# Patient Record
Sex: FEMALE | Race: WHITE | NOT HISPANIC OR LATINO | Employment: OTHER | URBAN - METROPOLITAN AREA
[De-identification: names, ages, dates, MRNs, and addresses within clinical notes are randomized per-mention and may not be internally consistent; named-entity substitution may affect disease eponyms.]

---

## 2018-04-20 ENCOUNTER — LAB REQUISITION (OUTPATIENT)
Dept: LAB | Facility: HOSPITAL | Age: 78
End: 2018-04-20

## 2018-04-20 ENCOUNTER — OFFICE VISIT (OUTPATIENT)
Dept: FAMILY MEDICINE CLINIC | Facility: CLINIC | Age: 78
End: 2018-04-20

## 2018-04-20 VITALS
HEIGHT: 63 IN | DIASTOLIC BLOOD PRESSURE: 68 MMHG | WEIGHT: 80 LBS | SYSTOLIC BLOOD PRESSURE: 110 MMHG | BODY MASS INDEX: 14.18 KG/M2 | OXYGEN SATURATION: 97 % | HEART RATE: 64 BPM

## 2018-04-20 DIAGNOSIS — Z12.2 ENCOUNTER FOR SCREENING FOR LUNG CANCER: ICD-10-CM

## 2018-04-20 DIAGNOSIS — Z87.891 HISTORY OF NICOTINE DEPENDENCE: Primary | ICD-10-CM

## 2018-04-20 DIAGNOSIS — R53.83 FATIGUE, UNSPECIFIED TYPE: ICD-10-CM

## 2018-04-20 DIAGNOSIS — J44.9 CHRONIC OBSTRUCTIVE PULMONARY DISEASE, UNSPECIFIED COPD TYPE (HCC): ICD-10-CM

## 2018-04-20 DIAGNOSIS — Z00.00 ROUTINE GENERAL MEDICAL EXAMINATION AT A HEALTH CARE FACILITY: ICD-10-CM

## 2018-04-20 DIAGNOSIS — Z71.6 TOBACCO ABUSE COUNSELING: ICD-10-CM

## 2018-04-20 DIAGNOSIS — R63.4 WEIGHT LOSS: ICD-10-CM

## 2018-04-20 DIAGNOSIS — E55.9 VITAMIN D DEFICIENCY: ICD-10-CM

## 2018-04-20 PROBLEM — K21.9 GERD (GASTROESOPHAGEAL REFLUX DISEASE): Status: ACTIVE | Noted: 2018-04-20

## 2018-04-20 PROBLEM — I82.4Y9 DEEP VEIN THROMBOSIS (DVT) OF PROXIMAL LOWER EXTREMITY (HCC): Status: ACTIVE | Noted: 2018-04-20

## 2018-04-20 PROBLEM — J43.9 EMPHYSEMA LUNG (HCC): Status: ACTIVE | Noted: 2018-04-20

## 2018-04-20 PROBLEM — H91.90 HEARING LOSS: Status: ACTIVE | Noted: 2018-04-20

## 2018-04-20 LAB
25(OH)D3+25(OH)D2 SERPL-MCNC: 17 NG/ML
ALBUMIN SERPL-MCNC: 3.1 G/DL (ref 3.2–4.8)
ALBUMIN/GLOB SERPL: 1 G/DL (ref 1.5–2.5)
ALP SERPL-CCNC: 115 U/L (ref 25–100)
ALT SERPL-CCNC: 9 U/L (ref 7–40)
AST SERPL-CCNC: 13 U/L (ref 0–33)
BASOPHILS # BLD AUTO: 0.04 10*3/MM3 (ref 0–0.2)
BASOPHILS NFR BLD AUTO: 0.5 % (ref 0–1)
BILIRUB SERPL-MCNC: 0.4 MG/DL (ref 0.3–1.2)
BUN SERPL-MCNC: 15 MG/DL (ref 9–23)
BUN/CREAT SERPL: 18.8 (ref 7–25)
CALCIUM SERPL-MCNC: 8.4 MG/DL (ref 8.7–10.4)
CHLORIDE SERPL-SCNC: 108 MMOL/L (ref 99–109)
CO2 SERPL-SCNC: 28 MMOL/L (ref 20–31)
CREAT SERPL-MCNC: 0.8 MG/DL (ref 0.6–1.3)
EOSINOPHIL # BLD AUTO: 0.07 10*3/MM3 (ref 0–0.3)
EOSINOPHIL NFR BLD AUTO: 0.8 % (ref 0–3)
ERYTHROCYTE [DISTWIDTH] IN BLOOD BY AUTOMATED COUNT: 15.2 % (ref 11.3–14.5)
FOLATE SERPL-MCNC: 7.46 NG/ML (ref 3.2–20)
GFR SERPLBLD CREATININE-BSD FMLA CKD-EPI: 70 ML/MIN/1.73
GFR SERPLBLD CREATININE-BSD FMLA CKD-EPI: 84 ML/MIN/1.73
GLOBULIN SER CALC-MCNC: 3.1 GM/DL
GLUCOSE SERPL-MCNC: 94 MG/DL (ref 70–100)
HCT VFR BLD AUTO: 40.6 % (ref 34.5–44)
HGB BLD-MCNC: 12.7 G/DL (ref 11.5–15.5)
IMM GRANULOCYTES # BLD: 0.02 10*3/MM3 (ref 0–0.03)
IMM GRANULOCYTES NFR BLD: 0.2 % (ref 0–0.6)
LYMPHOCYTES # BLD AUTO: 2 10*3/MM3 (ref 0.6–4.8)
LYMPHOCYTES NFR BLD AUTO: 23 % (ref 24–44)
MCH RBC QN AUTO: 28.2 PG (ref 27–31)
MCHC RBC AUTO-ENTMCNC: 31.3 G/DL (ref 32–36)
MCV RBC AUTO: 90.2 FL (ref 80–99)
MONOCYTES # BLD AUTO: 0.53 10*3/MM3 (ref 0–1)
MONOCYTES NFR BLD AUTO: 6.1 % (ref 0–12)
NEUTROPHILS # BLD AUTO: 6.04 10*3/MM3 (ref 1.5–8.3)
NEUTROPHILS NFR BLD AUTO: 69.6 % (ref 41–71)
PLATELET # BLD AUTO: 335 10*3/MM3 (ref 150–450)
POTASSIUM SERPL-SCNC: 4.2 MMOL/L (ref 3.5–5.5)
PROT SERPL-MCNC: 6.2 G/DL (ref 5.7–8.2)
RBC # BLD AUTO: 4.5 10*6/MM3 (ref 3.89–5.14)
SODIUM SERPL-SCNC: 141 MMOL/L (ref 132–146)
TSH SERPL DL<=0.005 MIU/L-ACNC: 3.15 MIU/ML (ref 0.35–5.35)
VIT B12 SERPL-MCNC: 539 PG/ML (ref 211–911)
WBC # BLD AUTO: 8.68 10*3/MM3 (ref 3.5–10.8)

## 2018-04-20 PROCEDURE — 36415 COLL VENOUS BLD VENIPUNCTURE: CPT | Performed by: INTERNAL MEDICINE

## 2018-04-20 PROCEDURE — 99203 OFFICE O/P NEW LOW 30 MIN: CPT | Performed by: INTERNAL MEDICINE

## 2018-04-20 RX ORDER — ESOMEPRAZOLE MAGNESIUM 40 MG/1
40 CAPSULE, DELAYED RELEASE ORAL DAILY PRN
COMMUNITY
End: 2019-06-28

## 2018-04-20 RX ORDER — ACETAMINOPHEN 500 MG
500 TABLET ORAL 2 TIMES DAILY PRN
COMMUNITY

## 2018-04-20 RX ORDER — FAMOTIDINE 20 MG/1
20 TABLET, FILM COATED ORAL DAILY PRN
COMMUNITY
End: 2020-11-12

## 2018-04-20 NOTE — PROGRESS NOTES
"77F here to est care.  Prior Dr. Jo patient      -c/o tired all the time, does not eat good, not hungry, cold all the time  -on prednisone in past for emphesema in past, \"really helped her\"  -ros: +chills, wt loss, lack of appetite, n/v, fatigue - stating does not eat right.  No diarrhea, no abdominal pain.    -h/o copd - recent cold - chronic cough, no production, nosob, no wheezing  Would like to have an inhaler.    Review of Systems   General: + fatigue, no fever/chills, unintentional wt loss, malaise, night sweats  Skin: no rash, no hives, no lesions,   Eyes: no visual disturbance   Heme: no brusing, no bleeding  ENT: no hearing loss, no dizziness, no nosebleed, no hoarseness  Endocrine: , no polyuria, polyphagia, polydipsia, no heat or cold intolerance  GI: as in hpi  : no dysuria, no urinary frequency,, no hematuria, or incontinence  Extremities: no edema, , no claudication  Cardiac: no chest pain, no palpitations, no orthopnea, no PND  Respiratory:  no sputum, no wheezing, no sob , no hemoptysis  Neuro: no headache, no seizure,, no paresthesias or weakness  Psych: no anxiety, no depression    Patient Active Problem List    Diagnosis   • Deep vein thrombosis (DVT) of proximal lower extremity [I82.4Y9]   • Hearing loss [H91.90]   • GERD (gastroesophageal reflux disease) [K21.9]     Overview Note:     nexium or pepcid prn     • Emphysema lung [J43.9]     Overview Note:     Current smoker, occassional       Past Surgical History:   Procedure Laterality Date   • CHOLECYSTECTOMY     • FEMUR FRACTURE SURGERY Left    • TUBAL ABDOMINAL LIGATION       Current Outpatient Prescriptions   Medication Sig Dispense Refill   • acetaminophen (TYLENOL) 500 MG tablet Take 500 mg by mouth 2 (Two) Times a Day As Needed for Mild Pain .     • esomeprazole (nexIUM) 40 MG capsule Take 40 mg by mouth Daily As Needed.     • famotidine (PEPCID) 20 MG tablet Take 20 mg by mouth Daily As Needed for Heartburn.       No current " "facility-administered medications for this visit.      No Known Allergies    Social History     Social History   • Marital status:    • Number of children: 5     Social History Main Topics   • Smoking status: Current Every Day Smoker     Packs/day: 1.00     Years: 50.00   • Smokeless tobacco: Never Used      Comment: down to 0.5ppd   • Alcohol use No   • Drug use: No     Other Topics Concern   • Not on file     Social History Narrative    4/18:    Daughter, Laura Saucedo , moved in with her - worries about her.    5 kids.    8 gk.    Exercise: no     Family History   Problem Relation Age of Onset   • Heart attack Mother    • Heart disease Mother    • Cervical cancer Daughter    • Lupus Daughter                    OBJECTIVE:    /68   Pulse 64   Ht 160 cm (63\")   Wt 36.3 kg (80 lb)   SpO2 97%   BMI 14.17 kg/m²   General: no distress, alert and oriented, frail and cachectic  Chest: Lung sounds are clear to auscultation, no wheezes, rales or rhonchi, with symmetric air entry. No respiratory distress  Cardiovascular: RRR with no murmur auscultated    GI: soft nt/nd, normoactive bs, no mass  Back: on cva tenderness  Ext: No edema, pedal pulses intact    Review of old chart shows fairly stable weight of 80-85lb past 3-4yrs.      A/P    1. History of nicotine dependence    2. Chronic obstructive pulmonary disease, unspecified COPD type    3. Weight loss    4. Fatigue, unspecified type    5. Encounter for screening for lung cancer    6. Tobacco abuse counseling      As above CT chest lung ca screening  Labs as ordered  Taught use of stiolto inhaler - samples given  F/u in 4 weeks    Smoking Cessation discussion:     We discussed that there are a number of resources and interventions to assist with smoking cessation if needed in the future including the 1-800-Quit Now line.(Included in the decision aid shared with the patient today).   On a scale of zero to ten, the patient rates their motivation to quit at a " 5 out of 10 today.  Referral to stop smoking class has been offered. Medication options for tobacco cessation have been discussed with the patient.     Recommendations for continued lung cancer screening:      We discussed the NCCN guidelines for lung cancer screening and the patient verbalized understanding that annual screening is recommended until fifteen years beyond smoking as long as they have no other disease or comorbidity that would prevent them from receiving cancer treatments such as surgery should a lung cancer be detected.  After review of the NCCN guidelines and recommendations for ongoing screening, the patient verbalized understanding of recommendations for follow-up.  The patient has decided to proceed with a Low Dose Lung Cancer Screening CT today.

## 2018-04-20 NOTE — PATIENT INSTRUCTIONS
Please review the decision aid used during our discussion regarding the Low dose lung cancer screening visit today.                            For more information:    Quit Now [place]  1-800-QUIT-NOW  https://[place].quitlogix.org/en-[place]/  Steps to Quit Smoking  Smoking tobacco can be harmful to your health and can affect almost every organ in your body. Smoking puts you, and those around you, at risk for developing many serious chronic diseases. Quitting smoking is difficult, but it is one of the best things that you can do for your health. It is never too late to quit.  What are the benefits of quitting smoking?  When you quit smoking, you lower your risk of developing serious diseases and conditions, such as:  · Lung cancer or lung disease, such as COPD.  · Heart disease.  · Stroke.  · Heart attack.  · Infertility.  · Osteoporosis and bone fractures.  Additionally, symptoms such as coughing, wheezing, and shortness of breath may get better when you quit. You may also find that you get sick less often because your body is stronger at fighting off colds and infections. If you are pregnant, quitting smoking can help to reduce your chances of having a baby of low birth weight.  How do I get ready to quit?  When you decide to quit smoking, create a plan to make sure that you are successful. Before you quit:  · Pick a date to quit. Set a date within the next two weeks to give you time to prepare.  · Write down the reasons why you are quitting. Keep this list in places where you will see it often, such as on your bathroom mirror or in your car or wallet.  · Identify the people, places, things, and activities that make you want to smoke (triggers) and avoid them. Make sure to take these actions:  ¨ Throw away all cigarettes at home, at work, and in your car.  ¨ Throw away smoking accessories, such as ashtrays and lighters.  ¨ Clean your car and make sure to empty the ashtray.  ¨ Clean your home, including curtains  and carpets.  · Tell your family, friends, and coworkers that you are quitting. Support from your loved ones can make quitting easier.  · Talk with your health care provider about your options for quitting smoking.  · Find out what treatment options are covered by your health insurance.  What strategies can I use to quit smoking?  Talk with your healthcare provider about different strategies to quit smoking. Some strategies include:  · Quitting smoking altogether instead of gradually lessening how much you smoke over a period of time. Research shows that quitting “cold turkey” is more successful than gradually quitting.  · Attending in-person counseling to help you build problem-solving skills. You are more likely to have success in quitting if you attend several counseling sessions. Even short sessions of 10 minutes can be effective.  · Finding resources and support systems that can help you to quit smoking and remain smoke-free after you quit. These resources are most helpful when you use them often. They can include:  ¨ Online chats with a counselor.  ¨ Telephone quitlines.  ¨ Printed self-help materials.  ¨ Support groups or group counseling.  ¨ Text messaging programs.  ¨ Mobile phone applications.  · Taking medicines to help you quit smoking. (If you are pregnant or breastfeeding, talk with your health care provider first.) Some medicines contain nicotine and some do not. Both types of medicines help with cravings, but the medicines that include nicotine help to relieve withdrawal symptoms. Your health care provider may recommend:  ¨ Nicotine patches, gum, or lozenges.  ¨ Nicotine inhalers or sprays.  ¨ Non-nicotine medicine that is taken by mouth.  Talk with your health care provider about combining strategies, such as taking medicines while you are also receiving in-person counseling. Using these two strategies together makes you more likely to succeed in quitting than if you used either strategy on its  own.  If you are pregnant or breastfeeding, talk with your health care provider about finding counseling or other support strategies to quit smoking. Do not take medicine to help you quit smoking unless told to do so by your health care provider.  What things can I do to make it easier to quit?  Quitting smoking might feel overwhelming at first, but there is a lot that you can do to make it easier. Take these important actions:  · Reach out to your family and friends and ask that they support and encourage you during this time. Call telephone quitlines, reach out to support groups, or work with a counselor for support.  · Ask people who smoke to avoid smoking around you.  · Avoid places that trigger you to smoke, such as bars, parties, or smoke-break areas at work.  · Spend time around people who do not smoke.  · Lessen stress in your life, because stress can be a smoking trigger for some people. To lessen stress, try:  ¨ Exercising regularly.  ¨ Deep-breathing exercises.  ¨ Yoga.  ¨ Meditating.  ¨ Performing a body scan. This involves closing your eyes, scanning your body from head to toe, and noticing which parts of your body are particularly tense. Purposefully relax the muscles in those areas.  · Download or purchase mobile phone or tablet apps (applications) that can help you stick to your quit plan by providing reminders, tips, and encouragement. There are many free apps, such as QuitGuide from the CDC (Centers for Disease Control and Prevention). You can find other support for quitting smoking (smoking cessation) through smokefree.gov and other websites.  How will I feel when I quit smoking?  Within the first 24 hours of quitting smoking, you may start to feel some withdrawal symptoms. These symptoms are usually most noticeable 2-3 days after quitting, but they usually do not last beyond 2-3 weeks. Changes or symptoms that you might experience include:  · Mood swings.  · Restlessness, anxiety, or  irritation.  · Difficulty concentrating.  · Dizziness.  · Strong cravings for sugary foods in addition to nicotine.  · Mild weight gain.  · Constipation.  · Nausea.  · Coughing or a sore throat.  · Changes in how your medicines work in your body.  · A depressed mood.  · Difficulty sleeping (insomnia).  After the first 2-3 weeks of quitting, you may start to notice more positive results, such as:  · Improved sense of smell and taste.  · Decreased coughing and sore throat.  · Slower heart rate.  · Lower blood pressure.  · Clearer skin.  · The ability to breathe more easily.  · Fewer sick days.  Quitting smoking is very challenging for most people. Do not get discouraged if you are not successful the first time. Some people need to make many attempts to quit before they achieve long-term success. Do your best to stick to your quit plan, and talk with your health care provider if you have any questions or concerns.  This information is not intended to replace advice given to you by your health care provider. Make sure you discuss any questions you have with your health care provider.  Document Released: 12/12/2002 Document Revised: 08/15/2017 Document Reviewed: 05/03/2016  [employer] Patient Education © 2017 [employer]

## 2018-04-27 ENCOUNTER — HOSPITAL ENCOUNTER (OUTPATIENT)
Dept: CT IMAGING | Facility: HOSPITAL | Age: 78
Discharge: HOME | End: 2018-04-27
Admitting: INTERNAL MEDICINE

## 2018-04-27 DIAGNOSIS — Z87.891 HISTORY OF NICOTINE DEPENDENCE: ICD-10-CM

## 2018-04-27 PROCEDURE — G0297 LDCT FOR LUNG CA SCREEN: HCPCS

## 2018-04-27 RX ORDER — ERGOCALCIFEROL 1.25 MG/1
50000 CAPSULE ORAL WEEKLY
Qty: 12 CAPSULE | Refills: 0 | Status: SHIPPED | OUTPATIENT
Start: 2018-04-27 | End: 2018-05-25 | Stop reason: SDUPTHER

## 2018-04-30 PROBLEM — J47.9 BRONCHIECTASIS (HCC): Status: ACTIVE | Noted: 2018-04-30

## 2018-04-30 PROBLEM — R93.89 ABNORMAL CT OF THE CHEST: Status: ACTIVE | Noted: 2018-04-30

## 2018-05-25 ENCOUNTER — OFFICE VISIT (OUTPATIENT)
Dept: FAMILY MEDICINE CLINIC | Facility: CLINIC | Age: 78
End: 2018-05-25

## 2018-05-25 VITALS
BODY MASS INDEX: 13.79 KG/M2 | WEIGHT: 77.8 LBS | DIASTOLIC BLOOD PRESSURE: 68 MMHG | SYSTOLIC BLOOD PRESSURE: 110 MMHG | HEIGHT: 63 IN | OXYGEN SATURATION: 97 % | HEART RATE: 72 BPM

## 2018-05-25 DIAGNOSIS — E55.9 VITAMIN D DEFICIENCY: ICD-10-CM

## 2018-05-25 DIAGNOSIS — J47.9 BRONCHIECTASIS WITHOUT COMPLICATION (HCC): ICD-10-CM

## 2018-05-25 DIAGNOSIS — R63.6 LOW WEIGHT: ICD-10-CM

## 2018-05-25 DIAGNOSIS — J43.9 PULMONARY EMPHYSEMA, UNSPECIFIED EMPHYSEMA TYPE (HCC): Primary | ICD-10-CM

## 2018-05-25 PROCEDURE — 99214 OFFICE O/P EST MOD 30 MIN: CPT | Performed by: INTERNAL MEDICINE

## 2018-05-25 RX ORDER — ALBUTEROL SULFATE 90 UG/1
2 AEROSOL, METERED RESPIRATORY (INHALATION) EVERY 4 HOURS PRN
Qty: 1 INHALER | Refills: 2 | Status: SHIPPED | OUTPATIENT
Start: 2018-05-25 | End: 2018-05-25 | Stop reason: SDUPTHER

## 2018-05-25 RX ORDER — ERGOCALCIFEROL 1.25 MG/1
50000 CAPSULE ORAL WEEKLY
Qty: 12 CAPSULE | Refills: 0 | Status: SHIPPED | OUTPATIENT
Start: 2018-05-25 | End: 2019-06-28

## 2018-05-25 RX ORDER — ERGOCALCIFEROL 1.25 MG/1
50000 CAPSULE ORAL WEEKLY
Qty: 12 CAPSULE | Refills: 0 | Status: SHIPPED | OUTPATIENT
Start: 2018-05-25 | End: 2018-05-25 | Stop reason: SDUPTHER

## 2018-05-25 RX ORDER — ALBUTEROL SULFATE 90 UG/1
2 AEROSOL, METERED RESPIRATORY (INHALATION) EVERY 4 HOURS PRN
Qty: 1 INHALER | Refills: 2 | Status: SHIPPED | OUTPATIENT
Start: 2018-05-25 | End: 2019-06-28 | Stop reason: SDUPTHER

## 2018-05-25 NOTE — PATIENT INSTRUCTIONS
Vitamin d deficiency : take prescription vitamin d - 1 tablet once weekly. For 12 weeks. When done,  Take over the counter vitamin d 2000IU daily.    Inhaler: try the anoro inhaler once daily in the morning  Use the albuterol inhaler 2 puffs every 6hrs as needed.

## 2018-05-25 NOTE — PROGRESS NOTES
77F here for f/u several issues:    -banged left shin on  door - bruising    -copd - did not successfully use the stiolto inhaler, denies cough, wheezing or sob.    -asking for appetite stimulant - stating prednisone worked well in past - speaking to daughter, weights stable, h/o anorexia past 5yrs , has boost in past.    -vit d def - 17 4/18 labs, has not received prescription med.    Review of Systems   General: no fatigue, fever/chills, unintentional wt loss, malaise, night sweats  Skin: no rash, no hives, no lesions,   Eyes: no visual disturbance   Heme: no brusing, no bleeding  ENT: no hearing loss, no dizziness, no nosebleed, no hoarseness  Endocrine: , no polyuria, polyphagia, polydipsia, no heat or cold intolerance  GI: no nausea, no vomiting, no diarrhea, no constipation, no bleeding, no pain  : no dysuria, no urinary frequency,, no hematuria, or incontinence  Extremities: no edema, , no claudication  Cardiac: no chest pain, no palpitations, no orthopnea, no PND  Respiratory: no cough, no sputum, no wheezing, no sob , no hemoptysis  Neuro: no headache, no seizure,, no paresthesias or weakness  Psych: no anxiety, no depression      Patient Active Problem List   Diagnosis   • Deep vein thrombosis (DVT) of proximal lower extremity   • Hearing loss   • GERD (gastroesophageal reflux disease)   • Emphysema lung   • Bronchiectasis   • Abnormal CT of the chest      Past Surgical History:   Procedure Laterality Date   • CHOLECYSTECTOMY     • FEMUR FRACTURE SURGERY Left    • TUBAL ABDOMINAL LIGATION          Current Outpatient Prescriptions:   •  acetaminophen (TYLENOL) 500 MG tablet, Take 500 mg by mouth 2 (Two) Times a Day As Needed for Mild Pain ., Disp: , Rfl:   •  esomeprazole (nexIUM) 40 MG capsule, Take 40 mg by mouth Daily As Needed., Disp: , Rfl:   •  famotidine (PEPCID) 20 MG tablet, Take 20 mg by mouth Daily As Needed for Heartburn., Disp: , Rfl:   •  albuterol (PROVENTIL HFA;VENTOLIN HFA) 108  "(90 Base) MCG/ACT inhaler, Inhale 2 puffs Every 4 (Four) Hours As Needed for Wheezing., Disp: 1 inhaler, Rfl: 2  •  umeclidinium-vilanterol (ANORO ELLIPTA) 62.5-25 MCG/INH aerosol powder  inhaler, Inhale 1 puff Daily., Disp: 1 each, Rfl: 2  •  vitamin D (ERGOCALCIFEROL) 62558 units capsule capsule, Take 1 capsule by mouth 1 (One) Time Per Week., Disp: 12 capsule, Rfl: 0   No Known Allergies  Social History     Social History   • Marital status:      Spouse name: N/A   • Number of children: 5   • Years of education: N/A     Occupational History   • Not on file.     Social History Main Topics   • Smoking status: Current Every Day Smoker     Packs/day: 1.00     Years: 50.00   • Smokeless tobacco: Never Used      Comment: down to 0.5ppd   • Alcohol use No   • Drug use: No   • Sexual activity: Not on file     Other Topics Concern   • Not on file     Social History Narrative    4/18:    Daughter, Laura Saucedo , moved in with her - worries about her.    5 kids.    8 gk.    Exercise: no      Family History   Problem Relation Age of Onset   • Heart attack Mother    • Heart disease Mother    • Cervical cancer Daughter    • Lupus Daughter       /68   Pulse 72   Ht 160 cm (62.99\")   Wt 35.3 kg (77 lb 12.8 oz)   SpO2 97%   BMI 13.79 kg/m²   Gen: well appearing in nad, cachectic, hard of hearing, no resp effort  Eyes: conjunctiva clear, perrl, eomi  ENT: mmm, no thyromegaly, no lymphadenopathy  CV: s1, s2 reg  no bruits, no jvd  No peripheral edema, pedal pulses intact  Resp:  clear b/l no w/r/r  GI:  soft nt/nd  Skin: no clubbing or cyanosis, significant echymosis of left shin, no skin breakdown  Neuro: no focal deficits.  Gait stable    A/P    1. Pulmonary emphysema, unspecified emphysema type   -anoro taught, given samples, sent to pharmacy - once daily  Albuterol inhaler fo rprn use sent   2. Bronchiectasis without complication    3. Low weight - counseled on wt, bmi, supplementation with boost - her weights " have been stable  Consider megace only if declining   4. Vitamin D deficiency - script for high dose sent

## 2019-06-28 ENCOUNTER — LAB REQUISITION (OUTPATIENT)
Dept: LAB | Facility: HOSPITAL | Age: 79
End: 2019-06-28

## 2019-06-28 ENCOUNTER — OFFICE VISIT (OUTPATIENT)
Dept: FAMILY MEDICINE CLINIC | Facility: CLINIC | Age: 79
End: 2019-06-28

## 2019-06-28 VITALS
HEIGHT: 63 IN | HEART RATE: 94 BPM | SYSTOLIC BLOOD PRESSURE: 108 MMHG | WEIGHT: 84 LBS | DIASTOLIC BLOOD PRESSURE: 72 MMHG | OXYGEN SATURATION: 95 % | BODY MASS INDEX: 14.88 KG/M2

## 2019-06-28 DIAGNOSIS — F17.210 CIGARETTE SMOKER: ICD-10-CM

## 2019-06-28 DIAGNOSIS — K21.9 GASTROESOPHAGEAL REFLUX DISEASE, ESOPHAGITIS PRESENCE NOT SPECIFIED: ICD-10-CM

## 2019-06-28 DIAGNOSIS — Z13.1 SCREENING FOR DIABETES MELLITUS: ICD-10-CM

## 2019-06-28 DIAGNOSIS — J43.9 PULMONARY EMPHYSEMA, UNSPECIFIED EMPHYSEMA TYPE (HCC): Primary | ICD-10-CM

## 2019-06-28 DIAGNOSIS — Z13.220 SCREENING, LIPID: ICD-10-CM

## 2019-06-28 DIAGNOSIS — Z13.0 SCREENING FOR DEFICIENCY ANEMIA: ICD-10-CM

## 2019-06-28 DIAGNOSIS — Z00.00 ROUTINE GENERAL MEDICAL EXAMINATION AT A HEALTH CARE FACILITY: ICD-10-CM

## 2019-06-28 DIAGNOSIS — R63.0 LOSS OF APPETITE: ICD-10-CM

## 2019-06-28 DIAGNOSIS — Z13.29 SCREENING FOR THYROID DISORDER: ICD-10-CM

## 2019-06-28 PROBLEM — I82.4Y9 DEEP VEIN THROMBOSIS (DVT) OF PROXIMAL LOWER EXTREMITY (HCC): Status: RESOLVED | Noted: 2018-04-20 | Resolved: 2019-06-28

## 2019-06-28 PROCEDURE — 36415 COLL VENOUS BLD VENIPUNCTURE: CPT | Performed by: FAMILY MEDICINE

## 2019-06-28 PROCEDURE — 99214 OFFICE O/P EST MOD 30 MIN: CPT | Performed by: FAMILY MEDICINE

## 2019-06-28 RX ORDER — PREDNISONE 20 MG/1
40 TABLET ORAL DAILY
Qty: 10 TABLET | Refills: 0 | Status: SHIPPED | OUTPATIENT
Start: 2019-06-28 | End: 2019-07-03

## 2019-06-28 RX ORDER — AZITHROMYCIN 250 MG/1
TABLET, FILM COATED ORAL
Qty: 6 TABLET | Refills: 0 | Status: SHIPPED | OUTPATIENT
Start: 2019-06-28 | End: 2019-09-27

## 2019-06-28 RX ORDER — SACCHAROMYCES BOULARDII 250 MG
CAPSULE ORAL
COMMUNITY
Start: 2018-09-11 | End: 2019-06-28

## 2019-06-28 RX ORDER — HYDROCODONE BITARTRATE AND ACETAMINOPHEN 5; 325 MG/1; MG/1
TABLET ORAL
COMMUNITY
End: 2019-06-28

## 2019-06-28 RX ORDER — ALBUTEROL SULFATE 90 UG/1
2 AEROSOL, METERED RESPIRATORY (INHALATION) EVERY 4 HOURS PRN
Qty: 1 INHALER | Refills: 5 | Status: SHIPPED | OUTPATIENT
Start: 2019-06-28 | End: 2019-09-27 | Stop reason: SDUPTHER

## 2019-06-28 NOTE — PROGRESS NOTES
Chief Complaint   Patient presents with   • Emphysema   • Establish Care      Transferring care from another physician.    Emphysema   This is a chronic problem. Associated symptoms include abdominal pain, arthralgias, chills, coughing, fatigue, nausea and vomiting.      Chest cold for past week or so. Central air went out and now has window unit, air blowing on her makes it worse. She has been out of her inhalers. Yesterday choked up. White sputum. No fever. Feeling bad. Little bit of shortness of breath with activity. Smoked for several years, still smoking some.     Poor appetite:  Nothing tastes good and wants nothing to do with food. 2-3 years ago had a scope and she has a lot of acid reflux, couldn't swallow food. Its back again, eating too much she vomits back up and sick at stomach. Eats cereal in the evening. Taking OTC pepcid occasionally.     Rarely vaginal discharge, no vaginal bleeding.     Sometimes bulges in both of her legs. Gone down now.     Trouble walking ever since she broke her hip 4-5 years ago.     Review of Systems   Constitutional: Positive for appetite change, chills, fatigue and unexpected weight loss.   HENT: Positive for ear pain and hearing loss.    Eyes: Positive for visual disturbance.   Respiratory: Positive for cough and wheezing.    Cardiovascular: Positive for leg swelling.   Gastrointestinal: Positive for abdominal pain, diarrhea, nausea and vomiting.   Endocrine: Positive for cold intolerance and polydipsia.   Genitourinary: Positive for vaginal discharge. Negative for vaginal bleeding.   Musculoskeletal: Positive for arthralgias and gait problem.   Skin: Negative.    Neurological: Positive for headache.   Hematological: Bruises/bleeds easily.   Psychiatric/Behavioral: Negative.  Negative for depressed mood.      PHQ-2/PHQ-9 Depression Screening 6/28/2019   Little interest or pleasure in doing things 0   Feeling down, depressed, or hopeless 0   Total Score 0     STEADI Fall  Risk Clinician Key Questions   Have you fallen in the past year?: No  Do you feel unsteady with walking?: No  Are you worried about falling?: Yes    Stay Idependant Patient Questions   Patient Fall Risk Assessment Score : 0        Current Outpatient Medications on File Prior to Visit   Medication Sig Dispense Refill   • acetaminophen (TYLENOL) 500 MG tablet Take 500 mg by mouth 2 (Two) Times a Day As Needed for Mild Pain .     • famotidine (PEPCID) 20 MG tablet Take 20 mg by mouth Daily As Needed for Heartburn.     • [DISCONTINUED] albuterol (PROVENTIL HFA;VENTOLIN HFA) 108 (90 Base) MCG/ACT inhaler Inhale 2 puffs Every 4 (Four) Hours As Needed for Wheezing. 1 inhaler 2   • [DISCONTINUED] esomeprazole (nexIUM) 40 MG capsule Take 40 mg by mouth Daily As Needed.     • [DISCONTINUED] HYDROcodone-acetaminophen (NORCO) 5-325 MG per tablet hydrocodone 5 mg-acetaminophen 325 mg tablet     • [DISCONTINUED] saccharomyces boulardii (FLORASTOR) 250 MG capsule Florastor 250 mg capsule     • [DISCONTINUED] tamsulosin (FLOMAX) 0.4 MG capsule 24 hr capsule tamsulosin 0.4 mg capsule     • [DISCONTINUED] umeclidinium-vilanterol (ANORO ELLIPTA) 62.5-25 MCG/INH aerosol powder  inhaler Inhale 1 puff Daily. 1 each 2   • [DISCONTINUED] vitamin D (ERGOCALCIFEROL) 86672 units capsule capsule Take 1 capsule by mouth 1 (One) Time Per Week. 12 capsule 0     No current facility-administered medications on file prior to visit.        No Known Allergies    Past Medical History:   Diagnosis Date   • Bronchiectasis (CMS/HCC)    • Coronary artery calcification seen on CT scan    • Deep vein thrombosis (DVT) of proximal lower extremity (CMS/HCC) 4/20/2018   • Emphysema lung (CMS/HCC)    • Low body weight due to inadequate caloric intake         Past Surgical History:   Procedure Laterality Date   • CHOLECYSTECTOMY     • FEMUR FRACTURE SURGERY Left    • TUBAL ABDOMINAL LIGATION          Family History   Problem Relation Age of Onset   • Heart attack  "Mother    • Heart disease Mother    • Cervical cancer Daughter    • Lupus Daughter         Social History     Socioeconomic History   • Marital status:      Spouse name: Not on file   • Number of children: 5   • Years of education: Not on file   • Highest education level: Not on file   Tobacco Use   • Smoking status: Current Every Day Smoker     Packs/day: 1.00     Years: 50.00     Pack years: 50.00   • Smokeless tobacco: Never Used   • Tobacco comment: down to 0.5ppd   Substance and Sexual Activity   • Alcohol use: No   • Drug use: No   Social History Narrative    call after 1:30 pm Laura Saucedo daughter        Visit Vitals  /72 (BP Location: Left arm, Patient Position: Sitting, Cuff Size: Adult)   Pulse 94   Ht 160 cm (63\")   Wt 38.1 kg (84 lb)   SpO2 95%   BMI 14.88 kg/m²        Physical Exam   Constitutional: She is oriented to person, place, and time. No distress.   thin   HENT:   Nose: Nose normal.   Mouth/Throat: Oropharynx is clear and moist.   Eyes: Conjunctivae are normal. Pupils are equal, round, and reactive to light.   Neck: Neck supple. No thyromegaly present.   Cardiovascular: Normal rate and regular rhythm.   No murmur heard.  Pulses:       Posterior tibial pulses are 2+ on the right side, and 2+ on the left side.   Pulmonary/Chest: Effort normal and breath sounds normal.   Productive cough   Abdominal: Soft. She exhibits no distension. There is no hepatosplenomegaly. There is tenderness ( mild, diffuse). There is no rebound and no guarding.   Musculoskeletal: She exhibits no edema.   Lymphadenopathy:     She has no cervical adenopathy.   Neurological: She is alert and oriented to person, place, and time.   Skin: Skin is warm and dry. No rash noted.   Psychiatric: She has a normal mood and affect.   Vitals reviewed.           Elvin was seen today for emphysema and establish care.    Diagnoses and all orders for this visit:    Pulmonary emphysema, unspecified emphysema type (CMS/HCC)  -  "    CT Chest Without Contrast; Future  -     umeclidinium-vilanterol (ANORO ELLIPTA) 62.5-25 MCG/INH aerosol powder  inhaler; Inhale 1 puff Daily.  -     albuterol sulfate  (90 Base) MCG/ACT inhaler; Inhale 2 puffs Every 4 (Four) Hours As Needed for Wheezing.  -     azithromycin (ZITHROMAX) 250 MG tablet; Take 2 tablets the first day, then 1 tablet daily for 4 days.  -     predniSONE (DELTASONE) 20 MG tablet; Take 2 tablets by mouth Daily for 5 days.  Uncontrolled.  Treat COPD exacerbation with Z-Bassam and steroids.  Patient has not been using her steroid inhaler, restart Anoro.  Use Ventolin as needed for symptom relief.  Further evaluation with CT chest.  Previous CT chest done in April 2018 had showed chronic lung changes.  Cigarette smoker  Further evaluation with CT chest.  Previous CT chest done in April 2018 had showed chronic lung changes.  She is not eligible for low-dose CT since she has current symptoms.  Loss of appetite  -     Ambulatory Referral to Gastroenterology  Chronic problem.  Recommend repeat evaluation with gastroenterology.  Gastroesophageal reflux disease, esophagitis presence not specified  -     Ambulatory Referral to Gastroenterology  Chronic problem.  Recommend repeat evaluation with gastroenterology.  Screening, lipid  -     Lipid Panel; Future    Screening for deficiency anemia  -     CBC & Differential; Future    Screening for thyroid disorder  -     TSH Rfx On Abnormal To Free T4; Future    Screening for diabetes mellitus  -     Comprehensive Metabolic Panel; Future        Return in about 3 months (around 9/28/2019) for Medicare Wellness.

## 2019-06-29 LAB
ALBUMIN SERPL-MCNC: 3 G/DL (ref 3.5–5.2)
ALBUMIN/GLOB SERPL: 1 G/DL
ALP SERPL-CCNC: 111 U/L (ref 39–117)
ALT SERPL-CCNC: <5 U/L (ref 1–33)
AST SERPL-CCNC: 7 U/L (ref 1–32)
BASOPHILS # BLD AUTO: 0.05 10*3/MM3 (ref 0–0.2)
BASOPHILS NFR BLD AUTO: 0.6 % (ref 0–1.5)
BILIRUB SERPL-MCNC: 0.4 MG/DL (ref 0.2–1.2)
BUN SERPL-MCNC: 12 MG/DL (ref 8–23)
BUN/CREAT SERPL: 13.8 (ref 7–25)
CALCIUM SERPL-MCNC: 8.7 MG/DL (ref 8.6–10.5)
CHLORIDE SERPL-SCNC: 103 MMOL/L (ref 98–107)
CHOLEST SERPL-MCNC: 125 MG/DL (ref 0–200)
CO2 SERPL-SCNC: 30 MMOL/L (ref 22–29)
CREAT SERPL-MCNC: 0.87 MG/DL (ref 0.57–1)
EOSINOPHIL # BLD AUTO: 0.2 10*3/MM3 (ref 0–0.4)
EOSINOPHIL NFR BLD AUTO: 2.5 % (ref 0.3–6.2)
ERYTHROCYTE [DISTWIDTH] IN BLOOD BY AUTOMATED COUNT: 15.1 % (ref 12.3–15.4)
GLOBULIN SER CALC-MCNC: 3 GM/DL
GLUCOSE SERPL-MCNC: 99 MG/DL (ref 65–99)
HCT VFR BLD AUTO: 43.2 % (ref 34–46.6)
HDLC SERPL-MCNC: 47 MG/DL (ref 40–60)
HGB BLD-MCNC: 13 G/DL (ref 12–15.9)
IMM GRANULOCYTES # BLD AUTO: 0.02 10*3/MM3 (ref 0–0.05)
IMM GRANULOCYTES NFR BLD AUTO: 0.2 % (ref 0–0.5)
LDLC SERPL CALC-MCNC: 64 MG/DL (ref 0–100)
LYMPHOCYTES # BLD AUTO: 2.44 10*3/MM3 (ref 0.7–3.1)
LYMPHOCYTES NFR BLD AUTO: 30.4 % (ref 19.6–45.3)
MCH RBC QN AUTO: 28.3 PG (ref 26.6–33)
MCHC RBC AUTO-ENTMCNC: 30.1 G/DL (ref 31.5–35.7)
MCV RBC AUTO: 93.9 FL (ref 79–97)
MONOCYTES # BLD AUTO: 0.57 10*3/MM3 (ref 0.1–0.9)
MONOCYTES NFR BLD AUTO: 7.1 % (ref 5–12)
NEUTROPHILS # BLD AUTO: 4.74 10*3/MM3 (ref 1.7–7)
NEUTROPHILS NFR BLD AUTO: 59.2 % (ref 42.7–76)
NRBC BLD AUTO-RTO: 0.1 /100 WBC (ref 0–0.2)
PLATELET # BLD AUTO: 300 10*3/MM3 (ref 140–450)
POTASSIUM SERPL-SCNC: 4.7 MMOL/L (ref 3.5–5.2)
PROT SERPL-MCNC: 6 G/DL (ref 6–8.5)
RBC # BLD AUTO: 4.6 10*6/MM3 (ref 3.77–5.28)
SODIUM SERPL-SCNC: 141 MMOL/L (ref 136–145)
TRIGL SERPL-MCNC: 71 MG/DL (ref 0–150)
TSH SERPL DL<=0.005 MIU/L-ACNC: 2.84 MIU/ML (ref 0.27–4.2)
VLDLC SERPL CALC-MCNC: 14.2 MG/DL
WBC # BLD AUTO: 8.02 10*3/MM3 (ref 3.4–10.8)

## 2019-07-19 ENCOUNTER — HOSPITAL ENCOUNTER (OUTPATIENT)
Dept: CT IMAGING | Facility: HOSPITAL | Age: 79
Discharge: HOME | End: 2019-07-19
Admitting: FAMILY MEDICINE

## 2019-07-19 DIAGNOSIS — J43.9 PULMONARY EMPHYSEMA, UNSPECIFIED EMPHYSEMA TYPE (HCC): ICD-10-CM

## 2019-07-19 PROCEDURE — 71250 CT THORAX DX C-: CPT

## 2019-07-22 DIAGNOSIS — J43.9 PULMONARY EMPHYSEMA, UNSPECIFIED EMPHYSEMA TYPE (HCC): Primary | ICD-10-CM

## 2019-09-27 ENCOUNTER — OFFICE VISIT (OUTPATIENT)
Dept: FAMILY MEDICINE CLINIC | Facility: CLINIC | Age: 79
End: 2019-09-27

## 2019-09-27 VITALS
WEIGHT: 87.2 LBS | BODY MASS INDEX: 15.45 KG/M2 | HEART RATE: 86 BPM | DIASTOLIC BLOOD PRESSURE: 64 MMHG | OXYGEN SATURATION: 96 % | HEIGHT: 63 IN | SYSTOLIC BLOOD PRESSURE: 126 MMHG

## 2019-09-27 DIAGNOSIS — J43.9 PULMONARY EMPHYSEMA, UNSPECIFIED EMPHYSEMA TYPE (HCC): ICD-10-CM

## 2019-09-27 DIAGNOSIS — K21.9 GASTROESOPHAGEAL REFLUX DISEASE, ESOPHAGITIS PRESENCE NOT SPECIFIED: ICD-10-CM

## 2019-09-27 DIAGNOSIS — Z28.21 IMMUNIZATION REFUSED: ICD-10-CM

## 2019-09-27 DIAGNOSIS — Z00.00 WELL ADULT EXAM: Primary | ICD-10-CM

## 2019-09-27 PROCEDURE — 96160 PT-FOCUSED HLTH RISK ASSMT: CPT | Performed by: FAMILY MEDICINE

## 2019-09-27 PROCEDURE — G0439 PPPS, SUBSEQ VISIT: HCPCS | Performed by: FAMILY MEDICINE

## 2019-09-27 PROCEDURE — 99397 PER PM REEVAL EST PAT 65+ YR: CPT | Performed by: FAMILY MEDICINE

## 2019-09-27 RX ORDER — ALBUTEROL SULFATE 90 UG/1
2 AEROSOL, METERED RESPIRATORY (INHALATION) EVERY 4 HOURS PRN
Qty: 1 INHALER | Refills: 2 | Status: SHIPPED | OUTPATIENT
Start: 2019-09-27 | End: 2020-03-23 | Stop reason: SDUPTHER

## 2019-09-27 NOTE — PATIENT INSTRUCTIONS
Medicare Wellness  Personal Prevention Plan of Service     Date of Office Visit:  2019  Encounter Provider:  Symone Cuellar MD  Place of Service:  [facility]  Patient Name: Elvin Saucedo  :  1940    As part of the Medicare Wellness portion of your visit today, we are providing you with this personalized preventive plan of services (PPPS). This plan is based upon recommendations of the [place] Preventive Services Task Force (USPSTF) and the Advisory Committee on Immunization Practices (ACIP).    This lists the preventive care services that should be considered, and provides dates of when you are due. Items listed as completed are up-to-date and do not require any further intervention.    Health Maintenance   Topic Date Due   • TDAP/TD VACCINES (1 - Tdap) 1959   • ZOSTER VACCINE (1 of 2) 1990   • PNEUMOCOCCAL VACCINES (65+ LOW/MEDIUM RISK) (1 of 2 - PCV13) 2005   • INFLUENZA VACCINE  2019   • LUNG CANCER SCREENING  2020   • MEDICARE ANNUAL WELLNESS  2020       No orders of the defined types were placed in this encounter.      Return in about 6 months (around 3/27/2020) for Follow-up, AWV 1 year.

## 2019-09-27 NOTE — PROGRESS NOTES
The ABCs of the Annual Wellness Visit  Subsequent Medicare Wellness Visit    Chief Complaint   Patient presents with   • Medicare Wellness-subsequent       Subjective   History of Present Illness:  Elvin Saucedo is a 79 y.o. female who presents for a Subsequent Medicare Wellness Visit.    Breathing is doing much better. Lungs really don't bother her. Inhaler helps a lot. She doesn't want to see a specialist and she doesn't want to have procedures done.  Quit smoking in July due to the cost.     She has no appetite. Hasn't eaten breakfast or lunch. Drinks coffee in the morning and 2 vanilla wafers. She gets sick at her stomach. She had problems with acid findings on a scope. She uses Mylanta and 2 pepcid.     Blood pressure has increased. Doesn't eat more salt. Almost 200 on 2-3 different times. Yesterday 116/63.         HEALTH RISK ASSESSMENT    Recent Hospitalizations:  No hospitalization(s) within the last year.    Current Medical Providers:  Patient Care Team:  Symone Dinero MD as PCP - General (Family Medicine)    Smoking Status:  Social History     Tobacco Use   Smoking Status Current Every Day Smoker   • Packs/day: 1.00   • Years: 50.00   • Pack years: 50.00   Smokeless Tobacco Never Used   Tobacco Comment    down to 0.5ppd       Alcohol Consumption:  Social History     Substance and Sexual Activity   Alcohol Use No       Depression Screen:   PHQ-2/PHQ-9 Depression Screening 9/27/2019   Little interest or pleasure in doing things 0   Feeling down, depressed, or hopeless 0   Total Score 0       Fall Risk Screen:  STEADI Fall Risk Assessment was completed, and patient is at LOW risk for falls.Assessment completed on:9/27/2019    Health Habits and Functional and Cognitive Screening:  Functional & Cognitive Status 9/27/2019   Do you have difficulty preparing food and eating? No   Do you have difficulty bathing yourself, getting dressed or grooming yourself? No   Do you have difficulty using the  toilet? No   Do you have difficulty moving around from place to place? Yes   Do you have trouble with steps or getting out of a bed or a chair? Yes   Current Diet Well Balanced Diet   Exercise (times per week) 0 times per week   Current Exercise Activities Include None   Do you need help using the phone?  No   Are you deaf or do you have serious difficulty hearing?  Yes   Do you need help with transportation? Yes   Do you need help shopping? Yes   Do you need help preparing meals?  Yes   Do you need help with housework?  Yes   Do you need help with laundry? Yes   Do you need help taking your medications? Yes   Do you need help managing money? No   Do you ever drive or ride in a car without wearing a seat belt? No   Have you felt unusual stress, anger or loneliness in the last month? No   Who do you live with? Child   If you need help, do you have trouble finding someone available to you? No   Have you been bothered in the last four weeks by sexual problems? No   Do you have difficulty concentrating, remembering or making decisions? Yes         Does the patient have evidence of cognitive impairment? Yes    Word recall 2/3. Normal clock draw.     Asprin use counseling:Does not need ASA (and currently is not on it)    Age-appropriate Screening Schedule:  Refer to the list below for future screening recommendations based on patient's age, sex and/or medical conditions. Orders for these recommended tests are listed in the plan section. The patient has been provided with a written plan.    Health Maintenance   Topic Date Due   • TDAP/TD VACCINES (1 - Tdap) 08/[birth year]/1959   • ZOSTER VACCINE (1 of 2) 08/12/1990   • PNEUMOCOCCAL VACCINES (65+ LOW/MEDIUM RISK) (1 of 2 - PCV13) 08/12/2005   • INFLUENZA VACCINE  08/01/2019          The following portions of the patient's history were reviewed and updated as appropriate: She  has a past medical history of Bronchiectasis (CMS/[facility]), CKD (chronic kidney disease) stage 2, GFR 60-89  ml/min, Coronary artery calcification seen on CT scan, Deep vein thrombosis (DVT) of proximal lower extremity (CMS/HCC) (4/20/2018), Emphysema lung (CMS/HCC), and Low body weight due to inadequate caloric intake.  She  has a past surgical history that includes Cholecystectomy; Tubal ligation; and Femur fracture surgery (Left).  Her family history includes Cervical cancer in her daughter; Heart attack in her mother; Heart disease in her mother; Lupus in her daughter.  She  reports that she has been smoking.  She has a 50.00 pack-year smoking history. She has never used smokeless tobacco. She reports that she does not drink alcohol or use drugs.  She has No Known Allergies..    Outpatient Medications Prior to Visit   Medication Sig Dispense Refill   • acetaminophen (TYLENOL) 500 MG tablet Take 500 mg by mouth 2 (Two) Times a Day As Needed for Mild Pain .     • famotidine (PEPCID) 20 MG tablet Take 20 mg by mouth Daily As Needed for Heartburn.     • albuterol sulfate  (90 Base) MCG/ACT inhaler Inhale 2 puffs Every 4 (Four) Hours As Needed for Wheezing. 1 inhaler 5   • umeclidinium-vilanterol (ANORO ELLIPTA) 62.5-25 MCG/INH aerosol powder  inhaler Inhale 1 puff Daily. 1 each 5   • azithromycin (ZITHROMAX) 250 MG tablet Take 2 tablets the first day, then 1 tablet daily for 4 days. 6 tablet 0     No facility-administered medications prior to visit.        Patient Active Problem List   Diagnosis   • Hearing loss   • GERD (gastroesophageal reflux disease)   • Emphysema lung (CMS/HCC)   • Bronchiectasis (CMS/HCC)   • Abnormal CT of the chest   • Loss of appetite   • CKD (chronic kidney disease) stage 2, GFR 60-89 ml/min   • Immunization refused       Advanced Care Planning:  Patient has an advance directive - a copy has been provided and is visible in patient header  She is full code.       Review of Systems   Constitutional: Positive for appetite change.   HENT: Positive for ear pain and sinus pressure.          "Change in taste   Respiratory: Positive for cough.    Gastrointestinal: Positive for nausea.   Neurological: Positive for headaches.   Psychiatric/Behavioral: Negative for dysphoric mood.       Compared to one year ago, the patient feels her physical health is the same.  Compared to one year ago, the patient feels her mental health is the same.    Reviewed chart for potential of high risk medication in the elderly: not applicable  Reviewed chart for potential of harmful drug interactions in the elderly:not applicable    Objective         Vitals:    09/27/19 1347   BP: 126/64   BP Location: Left arm   Patient Position: Sitting   Cuff Size: Adult   Pulse: 86   SpO2: 96%   Weight: 39.6 kg (87 lb 3.2 oz)   Height: 160 cm (63\")   PainSc: 0-No pain       Body mass index is 15.45 kg/m².  Discussed the patient's BMI with her. The BMI is below average; BMI management plan is completed.    Physical Exam   Constitutional: She is oriented to person, place, and time. No distress.   HENT:   Right Ear: Tympanic membrane and ear canal normal. Decreased hearing is noted.   Left Ear: Tympanic membrane and ear canal normal. Decreased hearing is noted.   Nose: Nose normal.   Mouth/Throat: Oropharynx is clear and moist and mucous membranes are normal. No oral lesions.   Eyes: Right eye exhibits no discharge. Left eye exhibits no discharge.   Neck: Neck supple. No thyromegaly present.   Cardiovascular: Normal rate, regular rhythm and normal heart sounds.   No murmur heard.  Pulmonary/Chest: Effort normal and breath sounds normal.   Abdominal: Soft. Bowel sounds are normal. There is no tenderness.   Musculoskeletal: She exhibits no edema.   Lymphadenopathy:        Head (right side): No submandibular, no preauricular and no posterior auricular adenopathy present.        Head (left side): No submandibular, no preauricular and no posterior auricular adenopathy present.     She has no cervical adenopathy.   Neurological: She is alert and " oriented to person, place, and time.   Skin: Skin is warm and dry.   Psychiatric: She has a normal mood and affect. Her behavior is normal. Judgment and thought content normal.             Ct Chest Without Contrast    Result Date: 7/22/2019  Complete collapse now seen of the right middle lobe which is progressed than when compared to the prior study of 04/27/2018. Severe emphysematous changes seen throughout the remainder of the lung fields. Large calcified granuloma stable within the left lower lobe. Some improvement seen in the aeration of the right lung base in the interval.  D:  07/19/2019 E:  07/19/2019  This report was finalized on 7/22/2019 8:33 AM by Dr. Beulah Conti MD.      CBC w/Diff   Lab Results   Component Value Date    WBC 8.02 06/28/2019    RBC 4.60 06/28/2019    HGB 13.0 06/28/2019    HCT 43.2 06/28/2019    MCV 93.9 06/28/2019    MCH 28.3 06/28/2019    MCHC 30.1 (L) 06/28/2019    RDW 15.1 06/28/2019     06/28/2019    NEUTRORELPCT 59.2 06/28/2019    LYMPHORELPCT 30.4 06/28/2019    MONORELPCT 7.1 06/28/2019    EOSRELPCT 2.5 06/28/2019    BASORELPCT 0.6 06/28/2019    NEUTROABS 4.74 06/28/2019    LYMPHSABS 2.44 06/28/2019    MONOSABS 0.57 06/28/2019    EOSABS 0.20 06/28/2019    BASOSABS 0.05 06/28/2019    NRBC 0.1 06/28/2019     TSH Reflex to Free T4  Lab Results   Component Value Date    TSH 2.840 06/28/2019     CMP  Lab Results   Component Value Date    BUN 12 06/28/2019    CREATININE 0.87 06/28/2019    EGFRIFNONA 63 06/28/2019    EGFRIFAFRI 76 06/28/2019    BCR 13.8 06/28/2019    K 4.7 06/28/2019    CO2 30.0 (H) 06/28/2019    CALCIUM 8.7 06/28/2019    PROTENTOTREF 6.0 06/28/2019    ALBUMIN 3.00 (L) 06/28/2019    LABIL2 1.0 06/28/2019    AST 7 06/28/2019    ALT <5 06/28/2019      Lipid Panel:  Lab Results   Component Value Date    TRIG 71 06/28/2019    HDL 47 06/28/2019    VLDL 14.2 06/28/2019    LDL 64 06/28/2019             Assessment/Plan   Medicare Risks and Personalized Health  Plan  CMS Preventative Services Quick Reference  Advance Directive Discussion  Breast Cancer/Mammogram Screening  Hearing Problem  Immunizations Discussed/Encouraged (specific immunizations; Influenza, Pneumococcal 23 and Prevnar )    Counseled on health maintenance topics and preventative care recommendations: influenza vaccine, pneumonia vaccine    The above risks/problems have been discussed with the patient.  Pertinent information has been shared with the patient in the After Visit Summary.  Follow up plans and orders are seen below in the Assessment/Plan Section.    Diagnoses and all orders for this visit:    1. Well adult exam (Primary)  Reviewed living will scanned into the chart in detail with patient as well.  She states that she is full code.  No need for further mammogram screening counseled and patient agreed.  Reviewed labs done earlier this summer.  Plan to repeat yearly.  2. Pulmonary emphysema, unspecified emphysema type (CMS/[facility])  -     umeclidinium-vilanterol (ANORO ELLIPTA) 62.5-25 MCG/INH aerosol powder  inhaler; Inhale 1 puff Daily.  Dispense: 1 each; Refill: 5  -     albuterol sulfate  (90 Base) MCG/ACT inhaler; Inhale 2 puffs Every 4 (Four) Hours As Needed for Wheezing.  Dispense: 1 inhaler; Refill: 2  Stable symptoms with an oral.  Discussed with patient use of albuterol inhaler for quick relief if she does develop worsening symptoms.  She declined further evaluation with pulmonary or follow-up testing.  3. Gastroesophageal reflux disease, esophagitis presence not specified  Patient reports her decreased appetite is related to acid.  Offered to prescribe PPI but she declined.  She plans to continue Pepcid and Mylanta.  She declines further evaluation with gastroenterology.  4. Immunization refused  Patient declined all immunizations.    Follow Up:  Return in about 6 months (around 3/27/2020) for Follow-up, AWV 1 year.     An After Visit Summary and PPPS were given to the patient.

## 2020-03-23 DIAGNOSIS — J43.9 PULMONARY EMPHYSEMA, UNSPECIFIED EMPHYSEMA TYPE (HCC): ICD-10-CM

## 2020-03-23 RX ORDER — ALBUTEROL SULFATE 90 UG/1
2 AEROSOL, METERED RESPIRATORY (INHALATION) EVERY 4 HOURS PRN
Qty: 1 INHALER | Refills: 2 | Status: SHIPPED | OUTPATIENT
Start: 2020-03-23 | End: 2020-05-22 | Stop reason: SDUPTHER

## 2020-03-23 NOTE — TELEPHONE ENCOUNTER
CALLED PATIENT TO RESCHEDULE APPT FROM Friday TO MAY PER DR KIM RECOMMENDATION. PT VERBALIZED UNDERSTANDING AND CHANGED HER APPT, NEEDS HER INHALERS REFILLED.    WILL SEND IN REFILLS TO PATIENT PHARMACY

## 2020-05-22 ENCOUNTER — LAB REQUISITION (OUTPATIENT)
Dept: LAB | Facility: HOSPITAL | Age: 80
End: 2020-05-22

## 2020-05-22 ENCOUNTER — OFFICE VISIT (OUTPATIENT)
Dept: FAMILY MEDICINE CLINIC | Facility: CLINIC | Age: 80
End: 2020-05-22

## 2020-05-22 VITALS
HEIGHT: 63 IN | WEIGHT: 94 LBS | HEART RATE: 95 BPM | BODY MASS INDEX: 16.66 KG/M2 | DIASTOLIC BLOOD PRESSURE: 84 MMHG | OXYGEN SATURATION: 95 % | SYSTOLIC BLOOD PRESSURE: 160 MMHG

## 2020-05-22 DIAGNOSIS — J47.9 BRONCHIECTASIS WITHOUT COMPLICATION (HCC): ICD-10-CM

## 2020-05-22 DIAGNOSIS — R53.83 FATIGUE, UNSPECIFIED TYPE: ICD-10-CM

## 2020-05-22 DIAGNOSIS — R60.9 PERIPHERAL EDEMA: ICD-10-CM

## 2020-05-22 DIAGNOSIS — N18.2 CKD (CHRONIC KIDNEY DISEASE) STAGE 2, GFR 60-89 ML/MIN: ICD-10-CM

## 2020-05-22 DIAGNOSIS — J43.9 PULMONARY EMPHYSEMA, UNSPECIFIED EMPHYSEMA TYPE (HCC): Primary | ICD-10-CM

## 2020-05-22 DIAGNOSIS — Z00.00 ROUTINE GENERAL MEDICAL EXAMINATION AT A HEALTH CARE FACILITY: ICD-10-CM

## 2020-05-22 DIAGNOSIS — R03.0 ELEVATED BLOOD PRESSURE READING: ICD-10-CM

## 2020-05-22 DIAGNOSIS — R06.02 SHORTNESS OF BREATH: ICD-10-CM

## 2020-05-22 PROCEDURE — 99214 OFFICE O/P EST MOD 30 MIN: CPT | Performed by: FAMILY MEDICINE

## 2020-05-22 PROCEDURE — 36415 COLL VENOUS BLD VENIPUNCTURE: CPT | Performed by: FAMILY MEDICINE

## 2020-05-22 RX ORDER — TAMSULOSIN HYDROCHLORIDE 0.4 MG/1
CAPSULE ORAL
COMMUNITY
End: 2020-05-22

## 2020-05-22 RX ORDER — ALBUTEROL SULFATE 90 UG/1
2 AEROSOL, METERED RESPIRATORY (INHALATION) EVERY 4 HOURS PRN
Qty: 1 INHALER | Refills: 5 | Status: SHIPPED | OUTPATIENT
Start: 2020-05-22 | End: 2020-10-02 | Stop reason: SDUPTHER

## 2020-05-22 RX ORDER — ESOMEPRAZOLE MAGNESIUM 40 MG/1
CAPSULE, DELAYED RELEASE ORAL
COMMUNITY
End: 2020-05-22

## 2020-05-22 RX ORDER — FUROSEMIDE 20 MG/1
20 TABLET ORAL DAILY
Qty: 7 TABLET | Refills: 0 | Status: SHIPPED | OUTPATIENT
Start: 2020-05-22 | End: 2020-05-29 | Stop reason: SDUPTHER

## 2020-05-22 RX ORDER — HYDROCODONE BITARTRATE AND ACETAMINOPHEN 5; 325 MG/1; MG/1
TABLET ORAL
COMMUNITY
End: 2020-05-22

## 2020-05-22 RX ORDER — SACCHAROMYCES BOULARDII 250 MG
CAPSULE ORAL
COMMUNITY
Start: 2018-09-11 | End: 2020-05-22

## 2020-05-22 RX ORDER — PHENAZOPYRIDINE HYDROCHLORIDE 95 MG/1
TABLET ORAL
COMMUNITY
Start: 2018-09-10 | End: 2020-05-22

## 2020-05-22 NOTE — PROGRESS NOTES
Chief Complaint   Patient presents with   • Follow-up     6 mth follow up   • COPD   • Leg Swelling   • Fatigue        COPD   Associated symptoms include appetite change and myalgias. Pertinent negatives include no chest pain.      Legs have been swollen and hurt. Her leg swelling has gone down some. She had a kidney infection and used medication OTC which helped it. Swelling for about a week. Her breathing is not as good as it was, hard to breathe with mask on. No energy, not eating and no appetite. When she eats she does feel better. Her head has been hurting, dizzy, headaches. She doesn't eat salt.     Daughter also present at appointment.  Reports that she has had her testing done in the past and nothing was wrong with her heart.  She has had swelling at times in the past.  It was attributed to her kidney stone.    Review of Systems   Constitutional: Positive for appetite change and fatigue.   Cardiovascular: Positive for leg swelling. Negative for chest pain and palpitations.   Gastrointestinal: Negative for nausea.   Musculoskeletal: Positive for arthralgias and myalgias.   Neurological: Positive for dizziness and headache.        Current Outpatient Medications   Medication Sig Dispense Refill   • acetaminophen (TYLENOL) 500 MG tablet Take 500 mg by mouth 2 (Two) Times a Day As Needed for Mild Pain .     • albuterol sulfate  (90 Base) MCG/ACT inhaler Inhale 2 puffs Every 4 (Four) Hours As Needed for Wheezing. 1 inhaler 5   • famotidine (PEPCID) 20 MG tablet Take 20 mg by mouth Daily As Needed for Heartburn.     • umeclidinium-vilanterol (Anoro Ellipta) 62.5-25 MCG/INH aerosol powder  inhaler Inhale 1 puff Daily. 60 each 5   • furosemide (Lasix) 20 MG tablet Take 1 tablet by mouth Daily for 7 days. 7 tablet 0     No current facility-administered medications for this visit.        No Known Allergies    Past Medical History:   Diagnosis Date   • Bronchiectasis (CMS/HCC)    • CKD (chronic kidney disease)  "stage 2, GFR 60-89 ml/min    • Coronary artery calcification seen on CT scan    • Deep vein thrombosis (DVT) of proximal lower extremity (CMS/HCC) 4/20/2018   • Emphysema lung (CMS/[facility])    • Low body weight due to inadequate caloric intake         Past Surgical History:   Procedure Laterality Date   • CHOLECYSTECTOMY     • FEMUR FRACTURE SURGERY Left    • TUBAL ABDOMINAL LIGATION          Family History   Problem Relation Age of Onset   • Heart attack Mother    • Heart disease Mother    • Cervical cancer Daughter    • Lupus Daughter         Social History     Socioeconomic History   • Marital status:      Spouse name: Not on file   • Number of children: 5   • Years of education: Not on file   • Highest education level: Not on file   Tobacco Use   • Smoking status: Current Every Day Smoker     Packs/day: 1.00     Years: 50.00     Pack years: 50.00   • Smokeless tobacco: Never Used   • Tobacco comment: down to 0.5ppd   Substance and Sexual Activity   • Alcohol use: No   • Drug use: No        Vitals:    05/22/20 1247   BP: 160/84   BP Location: Right arm   Patient Position: Sitting   Cuff Size: Small Adult   Pulse: 95   SpO2: 95%   Weight: 42.6 kg (94 lb)   Height: 160 cm (63\")      Body mass index is 16.65 kg/m².    Physical Exam   Constitutional: No distress.   HENT:   Right Ear: Decreased hearing is noted.   Left Ear: Decreased hearing is noted.   Cardiovascular: Normal rate and regular rhythm.   No murmur heard.  Pulmonary/Chest: Effort normal and breath sounds normal.   Musculoskeletal: She exhibits edema ( 2+ BLE).   Neurological: She is alert.   Psychiatric: She has a normal mood and affect. Her behavior is normal. Cognition and memory are impaired.   Vitals reviewed.           Elvin was seen today for follow-up, copd, leg swelling and fatigue.    Diagnoses and all orders for this visit:    Pulmonary emphysema, unspecified emphysema type (CMS/[facility])  -     umeclidinium-vilanterol (Anoro Ellipta) 62.5-25 " MCG/INH aerosol powder  inhaler; Inhale 1 puff Daily.  -     albuterol sulfate  (90 Base) MCG/ACT inhaler; Inhale 2 puffs Every 4 (Four) Hours As Needed for Wheezing.    Bronchiectasis without complication (CMS/HCC)    Peripheral edema  -     Adult Transthoracic Echo Complete W/ Cont if Necessary Per Protocol; Future  -     proBNP; Future  -     furosemide (Lasix) 20 MG tablet; Take 1 tablet by mouth Daily for 7 days.    CKD (chronic kidney disease) stage 2, GFR 60-89 ml/min    Shortness of breath  -     Adult Transthoracic Echo Complete W/ Cont if Necessary Per Protocol; Future  -     proBNP; Future    Fatigue, unspecified type  -     Adult Transthoracic Echo Complete W/ Cont if Necessary Per Protocol; Future  -     proBNP; Future  -     Basic metabolic panel; Future  -     CBC & Differential; Future    Elevated blood pressure reading      Patient with new onset of edema and fatigue.  She has chronic lung disease but shortness of breath has been worse recently as well.  Check stat labs today.  Plan to schedule echo to evaluate heart.  Initiate Lasix once a day to help with current edema while awaiting diagnostic work-up.  Follow-up next week in the office.  Continue current inhalers for COPD.    Return in about 1 week (around 5/29/2020) for Office visit edema, HTN.    Dr. Symone Cuellar

## 2020-05-23 ENCOUNTER — TELEPHONE (OUTPATIENT)
Dept: FAMILY MEDICINE CLINIC | Facility: CLINIC | Age: 80
End: 2020-05-23

## 2020-05-23 LAB
BASOPHILS # BLD AUTO: 0 X10E3/UL (ref 0–0.2)
BASOPHILS NFR BLD AUTO: 1 %
BUN SERPL-MCNC: 15 MG/DL (ref 8–27)
BUN/CREAT SERPL: 18 (ref 12–28)
CALCIUM SERPL-MCNC: 8.2 MG/DL (ref 8.7–10.3)
CHLORIDE SERPL-SCNC: 100 MMOL/L (ref 96–106)
CO2 SERPL-SCNC: 29 MMOL/L (ref 20–29)
CREAT SERPL-MCNC: 0.83 MG/DL (ref 0.57–1)
EOSINOPHIL # BLD AUTO: 0.1 X10E3/UL (ref 0–0.4)
EOSINOPHIL NFR BLD AUTO: 2 %
ERYTHROCYTE [DISTWIDTH] IN BLOOD BY AUTOMATED COUNT: 13.2 % (ref 11.7–15.4)
GLUCOSE SERPL-MCNC: 86 MG/DL (ref 65–99)
HCT VFR BLD AUTO: 38.8 % (ref 34–46.6)
HGB BLD-MCNC: 12.9 G/DL (ref 11.1–15.9)
IMM GRANULOCYTES # BLD AUTO: 0 X10E3/UL (ref 0–0.1)
IMM GRANULOCYTES NFR BLD AUTO: 1 %
LYMPHOCYTES # BLD AUTO: 1.8 X10E3/UL (ref 0.7–3.1)
LYMPHOCYTES NFR BLD AUTO: 28 %
Lab: NORMAL
MCH RBC QN AUTO: 29.5 PG (ref 26.6–33)
MCHC RBC AUTO-ENTMCNC: 33.2 G/DL (ref 31.5–35.7)
MCV RBC AUTO: 89 FL (ref 79–97)
MONOCYTES # BLD AUTO: 0.4 X10E3/UL (ref 0.1–0.9)
MONOCYTES NFR BLD AUTO: 7 %
NEUTROPHILS # BLD AUTO: 4.2 X10E3/UL (ref 1.4–7)
NEUTROPHILS NFR BLD AUTO: 61 %
NT-PROBNP SERPL-MCNC: 484 PG/ML (ref 0–738)
PLATELET # BLD AUTO: 289 X10E3/UL (ref 150–450)
POTASSIUM SERPL-SCNC: 3.5 MMOL/L (ref 3.5–5.2)
RBC # BLD AUTO: 4.37 X10E6/UL (ref 3.77–5.28)
SODIUM SERPL-SCNC: 141 MMOL/L (ref 134–144)
WBC # BLD AUTO: 6.6 X10E3/UL (ref 3.4–10.8)

## 2020-05-23 NOTE — TELEPHONE ENCOUNTER
Called and spoke with daughter. Apologized that I didn't report labs until this morning. All labs are normal. With the start of new medication, plan to recheck labs at next visit on Friday.

## 2020-05-27 ENCOUNTER — RESULTS ENCOUNTER (OUTPATIENT)
Dept: FAMILY MEDICINE CLINIC | Facility: CLINIC | Age: 80
End: 2020-05-27

## 2020-05-27 DIAGNOSIS — R53.83 FATIGUE, UNSPECIFIED TYPE: ICD-10-CM

## 2020-05-29 ENCOUNTER — LAB REQUISITION (OUTPATIENT)
Dept: LAB | Facility: HOSPITAL | Age: 80
End: 2020-05-29

## 2020-05-29 ENCOUNTER — OFFICE VISIT (OUTPATIENT)
Dept: FAMILY MEDICINE CLINIC | Facility: CLINIC | Age: 80
End: 2020-05-29

## 2020-05-29 VITALS
TEMPERATURE: 98.4 F | HEART RATE: 85 BPM | OXYGEN SATURATION: 96 % | BODY MASS INDEX: 16.3 KG/M2 | WEIGHT: 92 LBS | SYSTOLIC BLOOD PRESSURE: 120 MMHG | DIASTOLIC BLOOD PRESSURE: 80 MMHG | HEIGHT: 63 IN

## 2020-05-29 DIAGNOSIS — R63.0 ANOREXIA: Primary | ICD-10-CM

## 2020-05-29 DIAGNOSIS — Z00.00 ROUTINE GENERAL MEDICAL EXAMINATION AT A HEALTH CARE FACILITY: ICD-10-CM

## 2020-05-29 DIAGNOSIS — R60.9 PERIPHERAL EDEMA: ICD-10-CM

## 2020-05-29 DIAGNOSIS — R63.6 LOW BODY WEIGHT DUE TO INADEQUATE CALORIC INTAKE: ICD-10-CM

## 2020-05-29 DIAGNOSIS — R53.83 FATIGUE, UNSPECIFIED TYPE: ICD-10-CM

## 2020-05-29 PROCEDURE — 99213 OFFICE O/P EST LOW 20 MIN: CPT | Performed by: FAMILY MEDICINE

## 2020-05-29 PROCEDURE — 36415 COLL VENOUS BLD VENIPUNCTURE: CPT

## 2020-05-29 RX ORDER — MIRTAZAPINE 7.5 MG/1
7.5 TABLET, FILM COATED ORAL NIGHTLY
Qty: 30 TABLET | Refills: 2 | Status: SHIPPED | OUTPATIENT
Start: 2020-05-29 | End: 2020-06-26 | Stop reason: SDUPTHER

## 2020-05-29 RX ORDER — FUROSEMIDE 20 MG/1
20 TABLET ORAL DAILY PRN
Qty: 30 TABLET | Refills: 2 | Status: SHIPPED | OUTPATIENT
Start: 2020-05-29 | End: 2020-08-24

## 2020-05-29 NOTE — PROGRESS NOTES
Chief Complaint   Patient presents with   • Emphysema   • Fatigue     She reports that she has not been feeling well since yesterday, she is very exhausted and weak        HPI   She feels tired. Can't eat. Ate white castle, milk and jello today. She has vomiting. No appetite. Family tries to get her to eat something. She's starving to death the time. Denies stomach pain. Uses Mylanta as needed.  Standing on her feet very long she gets deathly sick and has to sit down. Legs are better. Legs are sore and still hurt. She sleeps all the time now, 3 naps per day.     Daughter thinks she's anorexic and long term problem. Her body is failing. She's gotten worse and worse with her eating and her body has given up on her. Just now started on white castle kick. Before ate bowel of jello with marshmallows only. Daughter says she's perfectly healthy but she doesn't eat enough. Her weight is usually 78 lbs and surprised by 92 pounds. If you try to make her eat. She told daughter she's getting fat and is worried over 92 pounds. Stomach pain and weakness is because of not eating anything healthy. Daughter fix meal every day, takes maybe one bite and that's it other than jello. Its all in her mind. She's trying to eat. Family thinks its taking over. She has the same symptoms that her  had with lung cancer. She has half a lung.   from heart attack and couldn't get oxygen to his heart. She likes to smoke. Doesn't like to eat and her body is starved. Every once and awhile likes [employer]. Then she goes straight home to the bathroom from too much food. Anorexia started a year before she broke her hip 4-5 years ago. She had a cold and lost some weight, someone said she looked pretty good.         Review of Systems   Constitutional: Positive for appetite change and fatigue.   Cardiovascular: Negative for leg swelling.   Gastrointestinal: Positive for nausea and vomiting. Negative for abdominal distention.         Current Outpatient Medications   Medication Sig Dispense Refill   • acetaminophen (TYLENOL) 500 MG tablet Take 500 mg by mouth 2 (Two) Times a Day As Needed for Mild Pain .     • albuterol sulfate  (90 Base) MCG/ACT inhaler Inhale 2 puffs Every 4 (Four) Hours As Needed for Wheezing. 1 inhaler 5   • famotidine (PEPCID) 20 MG tablet Take 20 mg by mouth Daily As Needed for Heartburn.     • furosemide (Lasix) 20 MG tablet Take 1 tablet by mouth Daily As Needed (edema). 30 tablet 2   • umeclidinium-vilanterol (Anoro Ellipta) 62.5-25 MCG/INH aerosol powder  inhaler Inhale 1 puff Daily. 60 each 5   • mirtazapine (REMERON) 7.5 MG tablet Take 1 tablet by mouth Every Night. 30 tablet 2     No current facility-administered medications for this visit.        No Known Allergies    Past Medical History:   Diagnosis Date   • Bronchiectasis (CMS/HCC)    • CKD (chronic kidney disease) stage 2, GFR 60-89 ml/min    • Coronary artery calcification seen on CT scan    • Deep vein thrombosis (DVT) of proximal lower extremity (CMS/HCC) 4/20/2018   • Emphysema lung (CMS/HCC)    • Low body weight due to inadequate caloric intake         Past Surgical History:   Procedure Laterality Date   • CHOLECYSTECTOMY     • FEMUR FRACTURE SURGERY Left    • TUBAL ABDOMINAL LIGATION          Family History   Problem Relation Age of Onset   • Heart attack Mother    • Heart disease Mother    • Cervical cancer Daughter    • Lupus Daughter         Social History     Socioeconomic History   • Marital status:      Spouse name: Not on file   • Number of children: 5   • Years of education: Not on file   • Highest education level: Not on file   Tobacco Use   • Smoking status: Current Every Day Smoker     Packs/day: 1.00     Years: 50.00     Pack years: 50.00   • Smokeless tobacco: Never Used   • Tobacco comment: down to 0.5ppd   Substance and Sexual Activity   • Alcohol use: No   • Drug use: No        Vitals:    05/29/20 1326   BP: 120/80  "  Pulse: 85   Temp: 98.4 °F (36.9 °C)   SpO2: 96%   Weight: 41.7 kg (92 lb)   Height: 160 cm (63\")   PainSc:   4      Body mass index is 16.3 kg/m².    Physical Exam   Constitutional: She appears cachectic. No distress.   HENT:   Right Ear: Decreased hearing is noted.   Left Ear: Decreased hearing is noted.   Cardiovascular: Normal rate and regular rhythm.   No murmur heard.  Pulmonary/Chest: Effort normal and breath sounds normal.   Musculoskeletal: She exhibits edema ( trace BLE).   Neurological: She is alert. Gait normal.   Vitals reviewed.      Results for orders placed or performed in visit on 05/23/20   Basic Metabolic Panel   Result Value Ref Range    Glucose 86 65 - 99 mg/dL    BUN 15 8 - 27 mg/dL    Creatinine 0.83 0.57 - 1.00 mg/dL    eGFR Non African Am 67 >59 mL/min/1.73    eGFR African Am 78 >59 mL/min/1.73    BUN/Creatinine Ratio 18 12 - 28    Sodium 141 134 - 144 mmol/L    Potassium 3.5 3.5 - 5.2 mmol/L    Chloride 100 96 - 106 mmol/L    Total CO2 29 20 - 29 mmol/L    Calcium 8.2 (L) 8.7 - 10.3 mg/dL   proBNP   Result Value Ref Range    proBNP 484 0 - 738 pg/mL   Please Note   Result Value Ref Range    Please note Comment    CBC & Differential   Result Value Ref Range    WBC 6.6 3.4 - 10.8 x10E3/uL    RBC 4.37 3.77 - 5.28 x10E6/uL    Hemoglobin 12.9 11.1 - 15.9 g/dL    Hematocrit 38.8 34.0 - 46.6 %    MCV 89 79 - 97 fL    MCH 29.5 26.6 - 33.0 pg    MCHC 33.2 31.5 - 35.7 g/dL    RDW 13.2 11.7 - 15.4 %    Platelets 289 150 - 450 x10E3/uL    Neutrophil Rel % 61 Not Estab. %    Lymphocyte Rel % 28 Not Estab. %    Monocyte Rel % 7 Not Estab. %    Eosinophil Rel % 2 Not Estab. %    Basophil Rel % 1 Not Estab. %    Neutrophils Absolute 4.2 1.4 - 7.0 x10E3/uL    Lymphocytes Absolute 1.8 0.7 - 3.1 x10E3/uL    Monocytes Absolute 0.4 0.1 - 0.9 x10E3/uL    Eosinophils Absolute 0.1 0.0 - 0.4 x10E3/uL    Basophils Absolute 0.0 0.0 - 0.2 x10E3/uL    Immature Granulocyte Rel % 1 Not Estab. %    Immature Grans " Absolute 0.0 0.0 - 0.1 x10E3/uL        Diania was seen today for emphysema and fatigue.    Diagnoses and all orders for this visit:    Anorexia    Low body weight due to inadequate caloric intake    Peripheral edema  -     Basic metabolic panel; Future  -     furosemide (Lasix) 20 MG tablet; Take 1 tablet by mouth Daily As Needed (edema).    Fatigue, unspecified type  -     Basic metabolic panel; Future    Other orders  -     mirtazapine (REMERON) 7.5 MG tablet; Take 1 tablet by mouth Every Night.      Patient's weight has decreased 2 pounds since visit last week.  She had significant edema which improved with use of Lasix.  Weight change possibly due to improved edema.  Additionally daughter reports abnormal eating habits.  I have had a concern of anorexia for several years.  She is offered food regularly but does not eat very much.  Discussed starting appetite stimulant with Remeron at night and patient and family are agreeable.  Plan to reassess next month.  With the new medication of Lasix check repeat renal function and electrolytes today.    Return in about 27 days (around 6/25/2020) for Office visit with daughter present.    Dr. Symone Cuellar

## 2020-05-30 LAB
BUN SERPL-MCNC: 23 MG/DL (ref 8–23)
BUN/CREAT SERPL: 24.7 (ref 7–25)
CALCIUM SERPL-MCNC: 8.7 MG/DL (ref 8.6–10.5)
CHLORIDE SERPL-SCNC: 98 MMOL/L (ref 98–107)
CO2 SERPL-SCNC: 31.7 MMOL/L (ref 22–29)
CREAT SERPL-MCNC: 0.93 MG/DL (ref 0.57–1)
GLUCOSE SERPL-MCNC: 79 MG/DL (ref 65–99)
POTASSIUM SERPL-SCNC: 4.1 MMOL/L (ref 3.5–5.2)
SODIUM SERPL-SCNC: 139 MMOL/L (ref 136–145)

## 2020-06-02 ENCOUNTER — TELEPHONE (OUTPATIENT)
Dept: FAMILY MEDICINE CLINIC | Facility: CLINIC | Age: 80
End: 2020-06-02

## 2020-06-02 NOTE — TELEPHONE ENCOUNTER
Result Notes for Basic Metabolic Panel     Notes recorded by Jaki Bautista MA on 6/2/2020 at 10:07 AM EDT  Tried calling pt, no answer. I was unable to leave  for pt to call back.  ------    Notes recorded by Symone Dinero MD on 5/30/2020 at 9:15 AM EDT  Kidney function decreased slightly. Normal electrolytes.

## 2020-06-18 ENCOUNTER — HOSPITAL ENCOUNTER (OUTPATIENT)
Dept: CARDIOLOGY | Facility: HOSPITAL | Age: 80
Discharge: HOME | End: 2020-06-18
Admitting: FAMILY MEDICINE

## 2020-06-18 VITALS — HEIGHT: 63 IN | WEIGHT: 91.93 LBS | BODY MASS INDEX: 16.29 KG/M2

## 2020-06-18 DIAGNOSIS — R06.02 SHORTNESS OF BREATH: ICD-10-CM

## 2020-06-18 DIAGNOSIS — R60.9 PERIPHERAL EDEMA: ICD-10-CM

## 2020-06-18 DIAGNOSIS — R53.83 FATIGUE, UNSPECIFIED TYPE: ICD-10-CM

## 2020-06-18 LAB
BH CV ECHO MEAS - AO MAX PG (FULL): 2.9 MMHG
BH CV ECHO MEAS - AO MAX PG: 6.6 MMHG
BH CV ECHO MEAS - AO MEAN PG (FULL): 1 MMHG
BH CV ECHO MEAS - AO MEAN PG: 3 MMHG
BH CV ECHO MEAS - AO ROOT AREA (BSA CORRECTED): 2
BH CV ECHO MEAS - AO ROOT AREA: 6.2 CM^2
BH CV ECHO MEAS - AO ROOT DIAM: 2.8 CM
BH CV ECHO MEAS - AO V2 MAX: 128 CM/SEC
BH CV ECHO MEAS - AO V2 MEAN: 84.6 CM/SEC
BH CV ECHO MEAS - AO V2 VTI: 34.9 CM
BH CV ECHO MEAS - AVA(I,A): 2 CM^2
BH CV ECHO MEAS - AVA(I,D): 2 CM^2
BH CV ECHO MEAS - AVA(V,A): 2.4 CM^2
BH CV ECHO MEAS - AVA(V,D): 2.4 CM^2
BH CV ECHO MEAS - BSA(HAYCOCK): 1.3 M^2
BH CV ECHO MEAS - BSA: 1.4 M^2
BH CV ECHO MEAS - BZI_BMI: 16.3 KILOGRAMS/M^2
BH CV ECHO MEAS - BZI_METRIC_HEIGHT: 160 CM
BH CV ECHO MEAS - BZI_METRIC_WEIGHT: 41.7 KG
BH CV ECHO MEAS - EDV(CUBED): 66.9 ML
BH CV ECHO MEAS - EDV(MOD-SP2): 65 ML
BH CV ECHO MEAS - EDV(MOD-SP4): 49 ML
BH CV ECHO MEAS - EDV(TEICH): 72.5 ML
BH CV ECHO MEAS - EF(CUBED): 69.6 %
BH CV ECHO MEAS - EF(MOD-BP): 55 %
BH CV ECHO MEAS - EF(MOD-SP2): 72.3 %
BH CV ECHO MEAS - EF(MOD-SP4): 55.1 %
BH CV ECHO MEAS - EF(TEICH): 61.7 %
BH CV ECHO MEAS - EF{MOD-BP}: 67 %
BH CV ECHO MEAS - ESV(CUBED): 20.3 ML
BH CV ECHO MEAS - ESV(MOD-SP2): 18 ML
BH CV ECHO MEAS - ESV(MOD-SP4): 22 ML
BH CV ECHO MEAS - ESV(TEICH): 27.8 ML
BH CV ECHO MEAS - FS: 32.8 %
BH CV ECHO MEAS - IVS/LVPW: 0.97
BH CV ECHO MEAS - IVSD: 0.64 CM
BH CV ECHO MEAS - LA DIMENSION: 2.6 CM
BH CV ECHO MEAS - LA/AO: 0.93
BH CV ECHO MEAS - LAD MAJOR: 5.4 CM
BH CV ECHO MEAS - LAT PEAK E' VEL: 6.4 CM/SEC
BH CV ECHO MEAS - LATERAL E/E' RATIO: 20
BH CV ECHO MEAS - LV DIASTOLIC VOL/BSA (35-75): 35.2 ML/M^2
BH CV ECHO MEAS - LV MASS(C)D: 72.8 GRAMS
BH CV ECHO MEAS - LV MASS(C)DI: 52.4 GRAMS/M^2
BH CV ECHO MEAS - LV MAX PG: 3.7 MMHG
BH CV ECHO MEAS - LV MEAN PG: 2 MMHG
BH CV ECHO MEAS - LV SYSTOLIC VOL/BSA (12-30): 15.8 ML/M^2
BH CV ECHO MEAS - LV V1 MAX: 96.1 CM/SEC
BH CV ECHO MEAS - LV V1 MEAN: 59 CM/SEC
BH CV ECHO MEAS - LV V1 VTI: 22.4 CM
BH CV ECHO MEAS - LVIDD: 4.1 CM
BH CV ECHO MEAS - LVIDS: 2.7 CM
BH CV ECHO MEAS - LVLD AP2: 6.7 CM
BH CV ECHO MEAS - LVLD AP4: 5.9 CM
BH CV ECHO MEAS - LVLS AP2: 5 CM
BH CV ECHO MEAS - LVLS AP4: 5.2 CM
BH CV ECHO MEAS - LVOT AREA (M): 3.1 CM^2
BH CV ECHO MEAS - LVOT AREA: 3.1 CM^2
BH CV ECHO MEAS - LVOT DIAM: 2 CM
BH CV ECHO MEAS - LVPWD: 0.66 CM
BH CV ECHO MEAS - MED PEAK E' VEL: 6.5 CM/SEC
BH CV ECHO MEAS - MEDIAL E/E' RATIO: 19.6
BH CV ECHO MEAS - MV A MAX VEL: 167 CM/SEC
BH CV ECHO MEAS - MV DEC SLOPE: 629 CM/SEC^2
BH CV ECHO MEAS - MV DEC TIME: 0.21 SEC
BH CV ECHO MEAS - MV E MAX VEL: 127 CM/SEC
BH CV ECHO MEAS - MV E/A: 0.76
BH CV ECHO MEAS - MV MAX PG: 13.4 MMHG
BH CV ECHO MEAS - MV MEAN PG: 5 MMHG
BH CV ECHO MEAS - MV P1/2T MAX VEL: 136 CM/SEC
BH CV ECHO MEAS - MV P1/2T: 63.3 MSEC
BH CV ECHO MEAS - MV V2 MAX: 183 CM/SEC
BH CV ECHO MEAS - MV V2 MEAN: 103 CM/SEC
BH CV ECHO MEAS - MV V2 VTI: 49.3 CM
BH CV ECHO MEAS - MVA P1/2T LCG: 1.6 CM^2
BH CV ECHO MEAS - MVA(P1/2T): 3.5 CM^2
BH CV ECHO MEAS - MVA(VTI): 1.4 CM^2
BH CV ECHO MEAS - PA ACC SLOPE: 620 CM/SEC^2
BH CV ECHO MEAS - PA ACC TIME: 0.14 SEC
BH CV ECHO MEAS - PA MAX PG: 2.7 MMHG
BH CV ECHO MEAS - PA PR(ACCEL): 15.6 MMHG
BH CV ECHO MEAS - PA V2 MAX: 81.4 CM/SEC
BH CV ECHO MEAS - RAP SYSTOLE: 8 MMHG
BH CV ECHO MEAS - RVSP: 44 MMHG
BH CV ECHO MEAS - SI(AO): 154.6 ML/M^2
BH CV ECHO MEAS - SI(CUBED): 33.5 ML/M^2
BH CV ECHO MEAS - SI(LVOT): 50.6 ML/M^2
BH CV ECHO MEAS - SI(MOD-SP2): 33.8 ML/M^2
BH CV ECHO MEAS - SI(MOD-SP4): 19.4 ML/M^2
BH CV ECHO MEAS - SI(TEICH): 32.2 ML/M^2
BH CV ECHO MEAS - SV(AO): 214.9 ML
BH CV ECHO MEAS - SV(CUBED): 46.6 ML
BH CV ECHO MEAS - SV(LVOT): 70.4 ML
BH CV ECHO MEAS - SV(MOD-SP2): 47 ML
BH CV ECHO MEAS - SV(MOD-SP4): 27 ML
BH CV ECHO MEAS - SV(TEICH): 44.8 ML
BH CV ECHO MEAS - TAPSE (>1.6): 2 CM2
BH CV ECHO MEAS - TR MAX PG: 36 MMHG
BH CV ECHO MEAS - TR MAX VEL: 298 CM/SEC
BH CV ECHO MEASUREMENTS AVERAGE E/E' RATIO: 19.69
BH CV VAS BP LEFT ARM: NORMAL MMHG
BH CV XLRA - RV BASE: 3 CM
BH CV XLRA - RV LENGTH: 6 CM
BH CV XLRA - RV MID: 1.8 CM
BH CV XLRA - TDI S': 12.2 CM/SEC
LEFT ATRIUM VOLUME INDEX: 30.2 ML/M^2
LEFT ATRIUM VOLUME: 42 ML

## 2020-06-18 PROCEDURE — 93306 TTE W/DOPPLER COMPLETE: CPT

## 2020-06-18 PROCEDURE — 93306 TTE W/DOPPLER COMPLETE: CPT | Performed by: INTERNAL MEDICINE

## 2020-06-19 ENCOUNTER — TELEPHONE (OUTPATIENT)
Dept: FAMILY MEDICINE CLINIC | Facility: CLINIC | Age: 80
End: 2020-06-19

## 2020-06-19 PROBLEM — I50.30 DIASTOLIC HEART FAILURE (HCC): Status: ACTIVE | Noted: 2020-01-01

## 2020-06-19 NOTE — TELEPHONE ENCOUNTER
call after 1:30 pm Laura Saucedo daughter. Home [phone], mobile [phone]    The test results show heart problem with relaxation of the heart, diastolic heart failure. Heart failure can cause swelling in the legs, shortness of breath, fatigue. I recommend using lasix as needed for swelling.

## 2020-06-26 ENCOUNTER — OFFICE VISIT (OUTPATIENT)
Dept: FAMILY MEDICINE CLINIC | Facility: CLINIC | Age: 80
End: 2020-06-26

## 2020-06-26 VITALS
SYSTOLIC BLOOD PRESSURE: 124 MMHG | DIASTOLIC BLOOD PRESSURE: 70 MMHG | BODY MASS INDEX: 16.3 KG/M2 | HEART RATE: 94 BPM | WEIGHT: 92 LBS | OXYGEN SATURATION: 94 % | HEIGHT: 63 IN

## 2020-06-26 DIAGNOSIS — R63.0 ANOREXIA: ICD-10-CM

## 2020-06-26 DIAGNOSIS — I50.32 CHRONIC DIASTOLIC HEART FAILURE (HCC): Primary | ICD-10-CM

## 2020-06-26 DIAGNOSIS — R63.6 LOW BODY WEIGHT DUE TO INADEQUATE CALORIC INTAKE: ICD-10-CM

## 2020-06-26 DIAGNOSIS — R93.1 ABNORMAL ECHOCARDIOGRAM: ICD-10-CM

## 2020-06-26 DIAGNOSIS — I10 ESSENTIAL HYPERTENSION: ICD-10-CM

## 2020-06-26 PROCEDURE — 99214 OFFICE O/P EST MOD 30 MIN: CPT | Performed by: FAMILY MEDICINE

## 2020-06-26 RX ORDER — MIRTAZAPINE 15 MG/1
15 TABLET, FILM COATED ORAL NIGHTLY
Qty: 30 TABLET | Refills: 2 | Status: SHIPPED | OUTPATIENT
Start: 2020-06-26 | End: 2020-10-02 | Stop reason: SDUPTHER

## 2020-06-26 NOTE — PROGRESS NOTES
Chief Complaint   Patient presents with   • Anorexia     f/u         HPI     Appetite still the same eating jello, once and awhile white castle. She says she's not hungry. She sleeps all the time. Blood pressure has been high this week at home 160s/104, but today its good. Blood pressure comes and goes.  She had a bad headache.  Her legs have not been swelling.  She occasionally has shortness of breath.    Review of Systems   Constitutional: Positive for appetite change. Negative for unexpected weight gain and unexpected weight loss.   Respiratory: Positive for shortness of breath.    Cardiovascular: Negative for leg swelling.   Neurological: Positive for headache.        Current Outpatient Medications   Medication Sig Dispense Refill   • acetaminophen (TYLENOL) 500 MG tablet Take 500 mg by mouth 2 (Two) Times a Day As Needed for Mild Pain .     • albuterol sulfate  (90 Base) MCG/ACT inhaler Inhale 2 puffs Every 4 (Four) Hours As Needed for Wheezing. 1 inhaler 5   • famotidine (PEPCID) 20 MG tablet Take 20 mg by mouth Daily As Needed for Heartburn.     • furosemide (Lasix) 20 MG tablet Take 1 tablet by mouth Daily As Needed (edema). 30 tablet 2   • mirtazapine (REMERON) 7.5 MG tablet Take 1 tablet by mouth Every Night. 30 tablet 2   • umeclidinium-vilanterol (Anoro Ellipta) 62.5-25 MCG/INH aerosol powder  inhaler Inhale 1 puff Daily. 60 each 5     No current facility-administered medications for this visit.        No Known Allergies    Past Medical History:   Diagnosis Date   • Bronchiectasis (CMS/HCC)    • CKD (chronic kidney disease) stage 2, GFR 60-89 ml/min    • Coronary artery calcification seen on CT scan    • Deep vein thrombosis (DVT) of proximal lower extremity (CMS/HCC) 4/20/2018   • Diastolic heart failure (CMS/HCC) 2020   • Emphysema lung (CMS/HCC)    • HTN (hypertension)    • Low body weight due to inadequate caloric intake         Past Surgical History:   Procedure Laterality Date   •  "CHOLECYSTECTOMY     • FEMUR FRACTURE SURGERY Left    • TUBAL ABDOMINAL LIGATION          Family History   Problem Relation Age of Onset   • Heart attack Mother    • Heart disease Mother    • Cervical cancer Daughter    • Lupus Daughter         Social History     Socioeconomic History   • Marital status:      Spouse name: Not on file   • Number of children: 5   • Years of education: Not on file   • Highest education level: Not on file   Tobacco Use   • Smoking status: Current Every Day Smoker     Packs/day: 1.00     Years: 50.00     Pack years: 50.00   • Smokeless tobacco: Never Used   • Tobacco comment: down to 0.5ppd   Substance and Sexual Activity   • Alcohol use: No   • Drug use: No        Vitals:    20 1253   BP: 124/70   Pulse: 94   SpO2: 94%   Weight: 41.7 kg (92 lb)   Height: 160 cm (62.99\")      Body mass index is 16.3 kg/m².    Physical Exam   Constitutional: No distress.   HENT:   Hard of hearing   Cardiovascular: Normal rate and regular rhythm.   No murmur heard.  Pulmonary/Chest: Effort normal and breath sounds normal.   Musculoskeletal: She exhibits no edema.   Psychiatric: She has a normal mood and affect.   Vitals reviewed.         Study date: 20   Patient Information     Patient Name  Elvin Saucedo MRN  [id number] Sex  Female  (Age)  1940 (79 y.o.)   Sedation Narrator Report     Sedation Narrator Report   Interpretation Summary     · Calculated EF = 55.0%  · Left ventricular diastolic dysfunction (grade I a) consistent with impaired relaxation.  · Left ventricular systolic function is normal.  · Mild mitral valve regurgitation is present  · Mild to moderate tricuspid valve regurgitation is present.  · Calculated right ventricular systolic pressure from tricuspid regurgitation is 44 mmHg.  · Saline test results are positive with valsalva manuever.         Elvin was seen today for anorexia.    Diagnoses and all orders for this visit:    Chronic diastolic heart failure " (CMS/[facility])  Reviewed with patient and family the echocardiogram findings.  She is currently euvolemic on exam.  Discussed CHF exacerbation and management.  At this time plan to continue Lasix as needed.  Abnormal echocardiogram  Reviewed findings with positive saline test.  Offered referral to cardiology but will hold off at this time.  Low body weight due to inadequate caloric intake  -     mirtazapine (REMERON) 15 MG tablet; Take 1 tablet by mouth Every Night.  Increase mirtazapine to 15 mg.  Cautioned on sedation.  Anorexia  -     mirtazapine (REMERON) 15 MG tablet; Take 1 tablet by mouth Every Night.  Increase mirtazapine to 15 mg.  Cautioned on sedation.  Essential hypertension  -     metoprolol tartrate (LOPRESSOR) 25 MG tablet; Take 0.5 tablets by mouth 2 (Two) Times a Day As Needed (BP >140/90).  Blood pressure is under control today.  Patient has symptomatic hypertension at home.  With her previous doctor around 5 years ago she was on an unknown medication. At this time start PRN medication for BP >140/90.  Cautioned on side effects.      Return for as scheduled.  Annual wellness visit in October.  Plan to check labs at that time as well.    Dr. Symone Cuellar

## 2020-08-24 DIAGNOSIS — R60.9 PERIPHERAL EDEMA: ICD-10-CM

## 2020-08-24 RX ORDER — FUROSEMIDE 20 MG/1
TABLET ORAL
Qty: 30 TABLET | Refills: 2 | Status: SHIPPED | OUTPATIENT
Start: 2020-08-24 | End: 2021-01-01

## 2020-10-02 ENCOUNTER — OFFICE VISIT (OUTPATIENT)
Dept: FAMILY MEDICINE CLINIC | Facility: CLINIC | Age: 80
End: 2020-10-02

## 2020-10-02 VITALS
DIASTOLIC BLOOD PRESSURE: 78 MMHG | HEART RATE: 85 BPM | OXYGEN SATURATION: 97 % | SYSTOLIC BLOOD PRESSURE: 140 MMHG | WEIGHT: 95.8 LBS | HEIGHT: 63 IN | BODY MASS INDEX: 16.97 KG/M2

## 2020-10-02 DIAGNOSIS — R63.0 ANOREXIA: ICD-10-CM

## 2020-10-02 DIAGNOSIS — R63.0 LOSS OF APPETITE: ICD-10-CM

## 2020-10-02 DIAGNOSIS — I10 ESSENTIAL HYPERTENSION: ICD-10-CM

## 2020-10-02 DIAGNOSIS — Z00.00 WELL ADULT EXAM: Primary | ICD-10-CM

## 2020-10-02 DIAGNOSIS — R63.6 LOW BODY WEIGHT DUE TO INADEQUATE CALORIC INTAKE: ICD-10-CM

## 2020-10-02 DIAGNOSIS — J43.9 PULMONARY EMPHYSEMA, UNSPECIFIED EMPHYSEMA TYPE (HCC): ICD-10-CM

## 2020-10-02 PROCEDURE — G0439 PPPS, SUBSEQ VISIT: HCPCS | Performed by: FAMILY MEDICINE

## 2020-10-02 PROCEDURE — 99397 PER PM REEVAL EST PAT 65+ YR: CPT | Performed by: FAMILY MEDICINE

## 2020-10-02 PROCEDURE — 96160 PT-FOCUSED HLTH RISK ASSMT: CPT | Performed by: FAMILY MEDICINE

## 2020-10-02 RX ORDER — MIRTAZAPINE 15 MG/1
15 TABLET, FILM COATED ORAL NIGHTLY
Qty: 90 TABLET | Refills: 3 | Status: SHIPPED | OUTPATIENT
Start: 2020-10-02 | End: 2021-01-01

## 2020-10-02 RX ORDER — ALBUTEROL SULFATE 90 UG/1
2 AEROSOL, METERED RESPIRATORY (INHALATION) EVERY 4 HOURS PRN
Qty: 18 G | Refills: 11 | Status: SHIPPED | OUTPATIENT
Start: 2020-10-02 | End: 2021-01-01 | Stop reason: SDUPTHER

## 2020-10-02 NOTE — PROGRESS NOTES
The ABCs of the Annual Wellness Visit  Subsequent Medicare Wellness Visit    Chief Complaint   Patient presents with   • Medicare Wellness-subsequent       Subjective   History of Present Illness:  Elvin Saucedo is a 80 y.o. female who presents for a Subsequent Medicare Wellness Visit.      Her left ear has been hurting this week. Pain radiated into her neck. She has more aches and pains. Home blood pressure machine made family worry about high readings. Blood pressure is good when doctor's check it. Blood pressure medication every other day to every day. Her head goes crazy and blood pressure improves when she takes medication. She sits down, smokes cigarette, and takes her blood pressure. Legs don't swell anymore.  She takes her inhaler every day.  Family reports that she has a horrible cough every morning but improves throughout the day.  She is done better since taking the inhalers.  She reports that she takes her appetite pill when she remembers.  She decides to eat what she wants.  She sometimes has abdominal pain.    HEALTH RISK ASSESSMENT    Recent Hospitalizations:  No hospitalization(s) within the last year.    Current Medical Providers:  Patient Care Team:  Symone Dinero MD as PCP - General (Family Medicine)    Smoking Status:  Social History     Tobacco Use   Smoking Status Current Every Day Smoker   • Packs/day: 1.00   • Years: 50.00   • Pack years: 50.00   Smokeless Tobacco Never Used   Tobacco Comment    down to 0.5ppd       Alcohol Consumption:  Social History     Substance and Sexual Activity   Alcohol Use No       Depression Screen:   PHQ-2/PHQ-9 Depression Screening 10/2/2020   Little interest or pleasure in doing things 0   Feeling down, depressed, or hopeless 0   Total Score 0       Fall Risk Screen:  ALETA Fall Risk Assessment was completed, and patient is at LOW risk for falls.Assessment completed on:10/2/2020   ALETA Fall Risk Clinician Key Questions   Have you fallen in  the past year?: No  Do you feel unsteady with walking?: Yes  Are you worried about falling?: Yes    Stay Idependant Patient Questions   Patient Fall Risk Assessment Score : 0        Health Habits and Functional and Cognitive Screening:  Functional & Cognitive Status 10/2/2020   Do you have difficulty preparing food and eating? Yes   Do you have difficulty bathing yourself, getting dressed or grooming yourself? No   Do you have difficulty using the toilet? No   Do you have difficulty moving around from place to place? Yes   Do you have trouble with steps or getting out of a bed or a chair? Yes   Current Diet Unhealthy Diet   Dental Exam Up to date   Eye Exam Up to date   Exercise (times per week) 0 times per week   Current Exercise Activities Include None   Do you need help using the phone?  Yes   Are you deaf or do you have serious difficulty hearing?  Yes   Do you need help with transportation? Yes   Do you need help shopping? Yes   Do you need help preparing meals?  No   Do you need help with housework?  Yes   Do you need help with laundry? Yes   Do you need help taking your medications? No   Do you need help managing money? No   Do you ever drive or ride in a car without wearing a seat belt? -   Have you felt unusual stress, anger or loneliness in the last month? No   Who do you live with? Child   If you need help, do you have trouble finding someone available to you? No   Have you been bothered in the last four weeks by sexual problems? No   Do you have difficulty concentrating, remembering or making decisions? No         Does the patient have evidence of cognitive impairment? No    Asprin use counseling:Does not need ASA (and currently is not on it)    Age-appropriate Screening Schedule:  Refer to the list below for future screening recommendations based on patient's age, sex and/or medical conditions. Orders for these recommended tests are listed in the plan section. The patient has been provided with a  written plan.    Health Maintenance   Topic Date Due   • TDAP/TD VACCINES (1 - Tdap) 08/12/1959   • ZOSTER VACCINE (1 of 2) 08/12/1990   • INFLUENZA VACCINE  08/01/2020          The following portions of the patient's history were reviewed and updated as appropriate: She  has a past medical history of Bronchiectasis (CMS/[facility]), CKD (chronic kidney disease) stage 2, GFR 60-89 ml/min, Coronary artery calcification seen on CT scan, Deep vein thrombosis (DVT) of proximal lower extremity (CMS/HCC) (4/20/2018), Diastolic heart failure (CMS/HCC) (2020), Emphysema lung (CMS/[facility]), HTN (hypertension), and Low body weight due to inadequate caloric intake.  She  has a past surgical history that includes Cholecystectomy; Tubal ligation; and Femur fracture surgery (Left).  Her family history includes Cervical cancer in her daughter; Heart attack in her mother; Heart disease in her mother; Lupus in her daughter.  She  reports that she has been smoking. She has a 50.00 pack-year smoking history. She has never used smokeless tobacco. She reports that she does not drink alcohol or use drugs.  She has No Known Allergies..    Outpatient Medications Prior to Visit   Medication Sig Dispense Refill   • acetaminophen (TYLENOL) 500 MG tablet Take 500 mg by mouth 2 (Two) Times a Day As Needed for Mild Pain .     • famotidine (PEPCID) 20 MG tablet Take 20 mg by mouth Daily As Needed for Heartburn.     • furosemide (LASIX) 20 MG tablet TAKE 1 TABLET BY MOUTH DAILY AS NEEDED FOR SWELLING 30 tablet 2   • albuterol sulfate  (90 Base) MCG/ACT inhaler Inhale 2 puffs Every 4 (Four) Hours As Needed for Wheezing. 1 inhaler 5   • metoprolol tartrate (LOPRESSOR) 25 MG tablet Take 0.5 tablets by mouth 2 (Two) Times a Day As Needed (BP >140/90). 30 tablet 3   • mirtazapine (REMERON) 15 MG tablet Take 1 tablet by mouth Every Night. 30 tablet 2   • umeclidinium-vilanterol (Anoro Ellipta) 62.5-25 MCG/INH aerosol powder  inhaler Inhale 1 puff Daily. 60  "each 5     No facility-administered medications prior to visit.        Patient Active Problem List   Diagnosis   • Hearing loss   • GERD (gastroesophageal reflux disease)   • Emphysema lung (CMS/HCC)   • Bronchiectasis (CMS/HCC)   • Abnormal CT of the chest   • Loss of appetite   • CKD (chronic kidney disease) stage 2, GFR 60-89 ml/min   • Immunization refused   • Low body weight due to inadequate caloric intake   • Anorexia   • Peripheral edema   • Diastolic heart failure (CMS/HCC)   • Abnormal echocardiogram   • Essential hypertension       Advanced Care Planning:  ACP discussion was held with the patient during this visit. Patient has an advance directive (not in EMR), copy requested.  [facility] to be scanned today.    Review of Systems   HENT: Positive for ear pain and hearing loss.    Respiratory: Positive for cough.    Gastrointestinal: Positive for abdominal pain.   Neurological: Positive for headaches.   Psychiatric/Behavioral: Negative for dysphoric mood.       Compared to one year ago, the patient feels her physical health is worse.  Compared to one year ago, the patient feels her mental health is worse.    Reviewed chart for potential of high risk medication in the elderly: not applicable  Reviewed chart for potential of harmful drug interactions in the elderly:not applicable    Objective         Vitals:    10/02/20 1401   BP: 140/78   Pulse: 85   SpO2: 97%   Weight: 43.5 kg (95 lb 12.8 oz)   Height: 160 cm (62.99\")   PainSc: 0-No pain       Body mass index is 16.97 kg/m².  Discussed the patient's BMI with her. The BMI is below average; BMI management plan is completed.    Physical Exam  Constitutional:       General: She is not in acute distress.  HENT:      Right Ear: Tympanic membrane and ear canal normal. Decreased hearing noted.      Left Ear: Tympanic membrane and ear canal normal. Decreased hearing noted.   Eyes:      General:         Right eye: No discharge.         Left eye: No discharge.      " Conjunctiva/sclera: Conjunctivae normal.   Neck:      Musculoskeletal: Neck supple.      Thyroid: No thyromegaly.   Cardiovascular:      Rate and Rhythm: Normal rate and regular rhythm.      Heart sounds: Normal heart sounds. No murmur.   Pulmonary:      Effort: Pulmonary effort is normal.      Breath sounds: Normal breath sounds.      Comments: Patient removed her mask during patient encounter of greater than 15 minutes.  Abdominal:      General: Bowel sounds are normal.      Palpations: Abdomen is soft.      Tenderness: There is abdominal tenderness ( Mild diffuse). There is no guarding or rebound.   Musculoskeletal:      Right lower leg: No edema.      Left lower leg: No edema.   Lymphadenopathy:      Head:      Right side of head: No submandibular, preauricular or posterior auricular adenopathy.      Left side of head: No submandibular, preauricular or posterior auricular adenopathy.      Cervical: No cervical adenopathy.   Skin:     General: Skin is warm and dry.   Neurological:      Mental Status: She is alert and oriented to person, place, and time.   Psychiatric:         Mood and Affect: Mood normal.         Behavior: Behavior normal.         Thought Content: Thought content normal.         Judgment: Judgment normal.               Assessment/Plan   Medicare Risks and Personalized Health Plan  CMS Preventative Services Quick Reference  Advance Directive Discussion  Dementia/Memory   Immunizations Discussed/Encouraged (specific immunizations; Influenza and Pneumococcal 23 )  Lung Cancer Risk    Patient's Body mass index is 16.97 kg/m². BMI is below normal parameters. Recommendations include: treating the underlying disease process and appetite stimulant.  Counseled on diet.    The above risks/problems have been discussed with the patient.  Pertinent information has been shared with the patient in the After Visit Summary.  Follow up plans and orders are seen below in the Assessment/Plan Section.    Diagnoses  and all orders for this visit:    1. Well adult exam (Primary)  Reviewed labs from earlier this summer.  Plan to check labs annually.  2. Essential hypertension  -     metoprolol tartrate (LOPRESSOR) 25 MG tablet; Take 0.5 tablets by mouth 2 (Two) Times a Day As Needed (BP >140/90).  Dispense: 90 tablet; Refill: 3  Continue to check blood pressure and and take metoprolol when blood pressure is elevated.  With her low body mass she is only taking half a pill and recommend purchasing a pill cutter for accurate dosing.  Additionally discussed smoking while taking her blood pressure likely elevates at 5-10 points.  3. Loss of appetite  -     mirtazapine (REMERON) 15 MG tablet; Take 1 tablet by mouth Every Night.  Dispense: 90 tablet; Refill: 3  Weight has improved since starting mirtazapine.  Patient however is noncompliant only taking the medication intermittently.  4. Low body weight due to inadequate caloric intake  -     mirtazapine (REMERON) 15 MG tablet; Take 1 tablet by mouth Every Night.  Dispense: 90 tablet; Refill: 3  Weight has improved since starting mirtazapine.  Patient however is noncompliant only taking the medication intermittently.  5. Anorexia  -     mirtazapine (REMERON) 15 MG tablet; Take 1 tablet by mouth Every Night.  Dispense: 90 tablet; Refill: 3  Weight has improved since starting mirtazapine.  Patient however is noncompliant only taking the medication intermittently.  6. Pulmonary emphysema, unspecified emphysema type (CMS/HCC)  -     umeclidinium-vilanterol (Anoro Ellipta) 62.5-25 MCG/INH aerosol powder  inhaler; Inhale 1 puff Daily.  Dispense: 60 each; Refill: 11  -     albuterol sulfate  (90 Base) MCG/ACT inhaler; Inhale 2 puffs Every 4 (Four) Hours As Needed for Wheezing.  Dispense: 18 g; Refill: 11  Stable.  Continue inhalers.  If she is not interested in lung cancer screening, pneumonia vaccines, flu vaccines, oral lung cancer screening.    Follow Up:  Return in about 1 year  (around 10/2/2021) for Medicare Wellness and labs.     An After Visit Summary and PPPS were given to the patient.

## 2020-10-02 NOTE — PATIENT INSTRUCTIONS
Medicare Wellness  Personal Prevention Plan of Service     Date of Office Visit:  10/02/2020  Encounter Provider:  Symone Cuellar MD  Place of Service:  [facility]  Patient Name: Elvin Saucedo  :  1940    As part of the Medicare Wellness portion of your visit today, we are providing you with this personalized preventive plan of services (PPPS). This plan is based upon recommendations of the [place] Preventive Services Task Force (USPSTF) and the Advisory Committee on Immunization Practices (ACIP).    This lists the preventive care services that should be considered, and provides dates of when you are due. Items listed as completed are up-to-date and do not require any further intervention.    Health Maintenance   Topic Date Due   • TDAP/TD VACCINES (1 - Tdap) 1959   • ZOSTER VACCINE (1 of 2) 1990   • Pneumococcal Vaccine 65+ (1 of 1 - PPSV23) 2005   • LUNG CANCER SCREENING  2020   • INFLUENZA VACCINE  2020   • MEDICARE ANNUAL WELLNESS  10/02/2021       No orders of the defined types were placed in this encounter.      Return in about 1 year (around 10/2/2021) for Medicare Wellness and labs.

## 2020-11-12 ENCOUNTER — OFFICE VISIT (OUTPATIENT)
Dept: FAMILY MEDICINE CLINIC | Facility: CLINIC | Age: 80
End: 2020-11-12

## 2020-11-12 VITALS
DIASTOLIC BLOOD PRESSURE: 65 MMHG | BODY MASS INDEX: 15.95 KG/M2 | SYSTOLIC BLOOD PRESSURE: 120 MMHG | HEIGHT: 63 IN | OXYGEN SATURATION: 95 % | HEART RATE: 83 BPM | TEMPERATURE: 97.5 F | WEIGHT: 90 LBS

## 2020-11-12 DIAGNOSIS — K21.9 GASTROESOPHAGEAL REFLUX DISEASE, UNSPECIFIED WHETHER ESOPHAGITIS PRESENT: Primary | ICD-10-CM

## 2020-11-12 PROCEDURE — 99213 OFFICE O/P EST LOW 20 MIN: CPT | Performed by: FAMILY MEDICINE

## 2020-11-12 RX ORDER — FAMOTIDINE 40 MG/1
40 TABLET, FILM COATED ORAL EVERY MORNING
Qty: 90 TABLET | Refills: 3 | Status: SHIPPED | OUTPATIENT
Start: 2020-11-12 | End: 2021-01-01 | Stop reason: SDUPTHER

## 2020-11-12 NOTE — PROGRESS NOTES
Chief Complaint   Patient presents with   • Abdominal Pain        Abdominal Pain  Associated symptoms include diarrhea, nausea and weight loss.        Her stomach has been bothering her. She's not been eating. Daughter started her on boost, with ice cream, chocolate and peanut butter 1.5 per day. Pepcid is not helping. A lot of diarrhea after eating.     Review of Systems   Constitutional: Positive for unexpected weight loss.   Gastrointestinal: Positive for abdominal pain, diarrhea and nausea.        Patient Active Problem List   Diagnosis   • Hearing loss   • GERD (gastroesophageal reflux disease)   • Emphysema lung (CMS/HCC)   • Bronchiectasis (CMS/HCC)   • Abnormal CT of the chest   • Loss of appetite   • CKD (chronic kidney disease) stage 2, GFR 60-89 ml/min   • Immunization refused   • Low body weight due to inadequate caloric intake   • Anorexia   • Peripheral edema   • Diastolic heart failure (CMS/HCC)   • Abnormal echocardiogram   • Essential hypertension       Current Outpatient Medications   Medication Sig Dispense Refill   • acetaminophen (TYLENOL) 500 MG tablet Take 500 mg by mouth 2 (Two) Times a Day As Needed for Mild Pain .     • albuterol sulfate  (90 Base) MCG/ACT inhaler Inhale 2 puffs Every 4 (Four) Hours As Needed for Wheezing. 18 g 11   • furosemide (LASIX) 20 MG tablet TAKE 1 TABLET BY MOUTH DAILY AS NEEDED FOR SWELLING 30 tablet 2   • metoprolol tartrate (LOPRESSOR) 25 MG tablet Take 0.5 tablets by mouth 2 (Two) Times a Day As Needed (BP >140/90). 90 tablet 3   • mirtazapine (REMERON) 15 MG tablet Take 1 tablet by mouth Every Night. 90 tablet 3   • umeclidinium-vilanterol (Anoro Ellipta) 62.5-25 MCG/INH aerosol powder  inhaler Inhale 1 puff Daily. 60 each 11   • famotidine (PEPCID) 40 MG tablet Take 1 tablet by mouth Every Morning. 90 tablet 3     No current facility-administered medications for this visit.        No Known Allergies    Past Medical History:   Diagnosis Date   •  "Bronchiectasis (CMS/[facility])    • CKD (chronic kidney disease) stage 2, GFR 60-89 ml/min    • Coronary artery calcification seen on CT scan    • Deep vein thrombosis (DVT) of proximal lower extremity (CMS/[facility]) 4/20/2018   • Diastolic heart failure (CMS/HCC) 2020   • Emphysema lung (CMS/[facility])    • HTN (hypertension)    • Low body weight due to inadequate caloric intake         Past Surgical History:   Procedure Laterality Date   • CHOLECYSTECTOMY     • FEMUR FRACTURE SURGERY Left    • TUBAL ABDOMINAL LIGATION          Family History   Problem Relation Age of Onset   • Heart attack Mother    • Heart disease Mother    • Cervical cancer Daughter    • Lupus Daughter         Social History     Socioeconomic History   • Marital status:      Spouse name: Not on file   • Number of children: 5   • Years of education: Not on file   • Highest education level: Not on file   Tobacco Use   • Smoking status: Current Every Day Smoker     Packs/day: 1.00     Years: 50.00     Pack years: 50.00   • Smokeless tobacco: Never Used   • Tobacco comment: down to 0.5ppd   Substance and Sexual Activity   • Alcohol use: No   • Drug use: No        Vitals:    11/12/20 1145   BP: 120/65   Pulse: 83   Temp: 97.5 °F (36.4 °C)   SpO2: 95%   Weight: 40.8 kg (90 lb)   Height: 160 cm (62.99\")   PainSc:   8      Body mass index is 15.95 kg/m².    Physical Exam  Constitutional:       General: She is not in acute distress.     Appearance: She is underweight. She is not ill-appearing.   HENT:      Ears:      Comments: Decreased hearing  Abdominal:      General: Abdomen is flat. There is no distension.      Tenderness: There is no abdominal tenderness. There is no guarding or rebound.   Neurological:      Mental Status: She is alert. Mental status is at baseline.              Diagnoses and all orders for this visit:    1. Gastroesophageal reflux disease, unspecified whether esophagitis present (Primary)  -     famotidine (PEPCID) 40 MG tablet; Take 1 " tablet by mouth Every Morning.  Dispense: 90 tablet; Refill: 3    Uncontrolled. At this time increase famotidine to 40 mg once a day. Encouraged to continue nutritional supplements for her weight. Patient has underlying anorexia which affects her care.    Return if symptoms worsen or fail to improve.    Dr. Symone Cuellar

## 2021-01-01 ENCOUNTER — OFFICE VISIT (OUTPATIENT)
Dept: FAMILY MEDICINE CLINIC | Facility: CLINIC | Age: 81
End: 2021-01-01

## 2021-01-01 ENCOUNTER — HOSPITAL ENCOUNTER (OUTPATIENT)
Dept: GENERAL RADIOLOGY | Facility: HOSPITAL | Age: 81
Discharge: HOME | End: 2021-11-08
Admitting: NURSE PRACTITIONER

## 2021-01-01 ENCOUNTER — LAB (OUTPATIENT)
Dept: LAB | Facility: HOSPITAL | Age: 81
End: 2021-01-01

## 2021-01-01 VITALS
SYSTOLIC BLOOD PRESSURE: 118 MMHG | HEIGHT: 63 IN | OXYGEN SATURATION: 90 % | DIASTOLIC BLOOD PRESSURE: 68 MMHG | HEART RATE: 88 BPM | WEIGHT: 83.5 LBS | BODY MASS INDEX: 14.79 KG/M2

## 2021-01-01 VITALS
HEART RATE: 63 BPM | OXYGEN SATURATION: 91 % | BODY MASS INDEX: 14.71 KG/M2 | SYSTOLIC BLOOD PRESSURE: 120 MMHG | DIASTOLIC BLOOD PRESSURE: 62 MMHG | HEIGHT: 63 IN | WEIGHT: 83 LBS

## 2021-01-01 DIAGNOSIS — R10.9 ACUTE RIGHT FLANK PAIN: ICD-10-CM

## 2021-01-01 DIAGNOSIS — N39.9 URINARY DISORDER: Primary | ICD-10-CM

## 2021-01-01 DIAGNOSIS — E46 PROTEIN-CALORIE MALNUTRITION, UNSPECIFIED SEVERITY (HCC): ICD-10-CM

## 2021-01-01 DIAGNOSIS — J43.9 PULMONARY EMPHYSEMA, UNSPECIFIED EMPHYSEMA TYPE (HCC): ICD-10-CM

## 2021-01-01 DIAGNOSIS — Z00.00 MEDICARE ANNUAL WELLNESS VISIT, SUBSEQUENT: Primary | ICD-10-CM

## 2021-01-01 DIAGNOSIS — Z28.21 IMMUNIZATION REFUSED: ICD-10-CM

## 2021-01-01 DIAGNOSIS — R63.0 ANOREXIA: ICD-10-CM

## 2021-01-01 DIAGNOSIS — I50.32 CHRONIC DIASTOLIC HEART FAILURE (HCC): ICD-10-CM

## 2021-01-01 DIAGNOSIS — R82.90 UNSPECIFIED ABNORMAL FINDINGS IN URINE: ICD-10-CM

## 2021-01-01 DIAGNOSIS — Z00.00 MEDICARE ANNUAL WELLNESS VISIT, SUBSEQUENT: ICD-10-CM

## 2021-01-01 DIAGNOSIS — I10 ESSENTIAL HYPERTENSION: ICD-10-CM

## 2021-01-01 DIAGNOSIS — K21.9 GASTROESOPHAGEAL REFLUX DISEASE, UNSPECIFIED WHETHER ESOPHAGITIS PRESENT: ICD-10-CM

## 2021-01-01 DIAGNOSIS — M25.551 ACUTE RIGHT HIP PAIN: ICD-10-CM

## 2021-01-01 DIAGNOSIS — J47.9 BRONCHIECTASIS WITHOUT COMPLICATION (HCC): ICD-10-CM

## 2021-01-01 DIAGNOSIS — R19.7 DIARRHEA, UNSPECIFIED TYPE: ICD-10-CM

## 2021-01-01 DIAGNOSIS — Z13.0 SCREENING FOR DEFICIENCY ANEMIA: ICD-10-CM

## 2021-01-01 LAB
ALBUMIN SERPL-MCNC: 3.5 G/DL (ref 3.5–5.2)
ALBUMIN/GLOB SERPL: 1.3 G/DL
ALP SERPL-CCNC: 81 U/L (ref 39–117)
ALT SERPL-CCNC: 5 U/L (ref 1–33)
AST SERPL-CCNC: 9 U/L (ref 1–32)
BACTERIA SPEC AEROBE CULT: NORMAL
BILIRUB BLD-MCNC: ABNORMAL MG/DL
BILIRUB SERPL-MCNC: 0.6 MG/DL (ref 0–1.2)
BUN SERPL-MCNC: 16 MG/DL (ref 8–23)
BUN/CREAT SERPL: 19.3 (ref 7–25)
CALCIUM SERPL-MCNC: 9 MG/DL (ref 8.6–10.5)
CHLORIDE SERPL-SCNC: 101 MMOL/L (ref 98–107)
CLARITY, POC: CLEAR
CO2 SERPL-SCNC: 30.8 MMOL/L (ref 22–29)
COLOR UR: ABNORMAL
CREAT SERPL-MCNC: 0.83 MG/DL (ref 0.57–1)
ERYTHROCYTE [DISTWIDTH] IN BLOOD BY AUTOMATED COUNT: 12.2 % (ref 12.3–15.4)
EXPIRATION DATE: ABNORMAL
GLOBULIN SER CALC-MCNC: 2.8 GM/DL
GLUCOSE SERPL-MCNC: 94 MG/DL (ref 65–99)
GLUCOSE UR STRIP-MCNC: ABNORMAL MG/DL
HCT VFR BLD AUTO: 40.5 % (ref 34–46.6)
HGB BLD-MCNC: 13.6 G/DL (ref 12–15.9)
KETONES UR QL: ABNORMAL
LEUKOCYTE EST, POC: ABNORMAL
Lab: ABNORMAL
MCH RBC QN AUTO: 31.1 PG (ref 26.6–33)
MCHC RBC AUTO-ENTMCNC: 33.6 G/DL (ref 31.5–35.7)
MCV RBC AUTO: 92.7 FL (ref 79–97)
NITRITE UR-MCNC: POSITIVE MG/ML
PH UR: 6 [PH] (ref 5–8)
PLATELET # BLD AUTO: 260 10*3/MM3 (ref 140–450)
POTASSIUM SERPL-SCNC: 4.4 MMOL/L (ref 3.5–5.2)
PROT SERPL-MCNC: 6.3 G/DL (ref 6–8.5)
PROT UR STRIP-MCNC: ABNORMAL MG/DL
RBC # BLD AUTO: 4.37 10*6/MM3 (ref 3.77–5.28)
RBC # UR STRIP: NEGATIVE /UL
SODIUM SERPL-SCNC: 140 MMOL/L (ref 136–145)
SP GR UR: 1.02 (ref 1–1.03)
TSH SERPL DL<=0.005 MIU/L-ACNC: 2.58 UIU/ML (ref 0.27–4.2)
UROBILINOGEN UR QL: ABNORMAL
WBC # BLD AUTO: 7.73 10*3/MM3 (ref 3.4–10.8)

## 2021-01-01 PROCEDURE — 99397 PER PM REEVAL EST PAT 65+ YR: CPT | Performed by: FAMILY MEDICINE

## 2021-01-01 PROCEDURE — G0439 PPPS, SUBSEQ VISIT: HCPCS | Performed by: FAMILY MEDICINE

## 2021-01-01 PROCEDURE — 81003 URINALYSIS AUTO W/O SCOPE: CPT | Performed by: NURSE PRACTITIONER

## 2021-01-01 PROCEDURE — 1170F FXNL STATUS ASSESSED: CPT | Performed by: FAMILY MEDICINE

## 2021-01-01 PROCEDURE — 96160 PT-FOCUSED HLTH RISK ASSMT: CPT | Performed by: FAMILY MEDICINE

## 2021-01-01 PROCEDURE — 1160F RVW MEDS BY RX/DR IN RCRD: CPT | Performed by: FAMILY MEDICINE

## 2021-01-01 PROCEDURE — 99213 OFFICE O/P EST LOW 20 MIN: CPT | Performed by: NURSE PRACTITIONER

## 2021-01-01 PROCEDURE — 1125F AMNT PAIN NOTED PAIN PRSNT: CPT | Performed by: FAMILY MEDICINE

## 2021-01-01 PROCEDURE — 73502 X-RAY EXAM HIP UNI 2-3 VIEWS: CPT

## 2021-01-01 PROCEDURE — 72100 X-RAY EXAM L-S SPINE 2/3 VWS: CPT

## 2021-01-01 PROCEDURE — 87086 URINE CULTURE/COLONY COUNT: CPT | Performed by: NURSE PRACTITIONER

## 2021-01-01 RX ORDER — CEPHALEXIN 500 MG/1
500 CAPSULE ORAL 2 TIMES DAILY
Qty: 14 CAPSULE | Refills: 0 | Status: SHIPPED | OUTPATIENT
Start: 2021-01-01 | End: 2021-01-01

## 2021-01-01 RX ORDER — ALBUTEROL SULFATE 90 UG/1
2 AEROSOL, METERED RESPIRATORY (INHALATION) EVERY 4 HOURS PRN
Qty: 18 G | Refills: 11 | Status: SHIPPED | OUTPATIENT
Start: 2021-01-01

## 2021-01-01 RX ORDER — FAMOTIDINE 40 MG/1
40 TABLET, FILM COATED ORAL EVERY MORNING
Qty: 90 TABLET | Refills: 3 | Status: SHIPPED | OUTPATIENT
Start: 2021-01-01

## 2021-08-12 NOTE — TELEPHONE ENCOUNTER
Rx Refill Note  Requested Prescriptions     Pending Prescriptions Disp Refills   • albuterol sulfate  (90 Base) MCG/ACT inhaler 18 g 11     Sig: Inhale 2 puffs Every 4 (Four) Hours As Needed for Wheezing.      Last office visit with prescribing clinician: 11/12/2020      Next office visit with prescribing clinician: 10/23/2021            Lorenza Martinez MA  08/12/21, 10:59 EDT

## 2021-08-12 NOTE — TELEPHONE ENCOUNTER
Caller: SUJEY JOHNSTON    Relationship: Emergency Contact    Best call back number: [phone]    Medication needed:   Requested Prescriptions     Pending Prescriptions Disp Refills   • albuterol sulfate  (90 Base) MCG/ACT inhaler 18 g 11     Sig: Inhale 2 puffs Every 4 (Four) Hours As Needed for Wheezing.       When do you need the refill by: ASAP    What additional details did the patient provide when requesting the medication: PATIENT HAS NO REFILLS  Does the patient have less than a 3 day supply:  [x] Yes  [] No    What is the patient's preferred pharmacy: [id number] - [place], [place] - [address] AT [facility] [place] [address] - [phone] [facility] [phone] FX

## 2021-10-23 NOTE — PROGRESS NOTES
The ABCs of the Annual Wellness Visit  Subsequent Medicare Wellness Visit    Chief Complaint   Patient presents with   • Medicare Wellness-subsequent     Fell Thursday.   • Annual Exam      Subjective    History of Present Illness:  Elvin Saucedo is a 81 y.o. female who presents for a Subsequent Medicare Wellness Visit.    Daughter also present at appointment.      She uses Anoro daily. Not using albuterol except every once and awhile. Coughs more. Continues to smoke. Increased phlegm.    Taking famotidine stomach pill, Uses Mylanta if she needs it.     She is having back pain for past month. Daughter thinks she is slumping more. Shoulders and legs are sore after fall, caught herself 10/21/21. No problems walking. She fell softly. Taking Tylenol without relief. Doesn't think kidney infection. No trouble going to the bathroom. Sitting more. Not getting out of the home in past 2 months. [facility] in apartment [facility]. Needs help walking. She has no energy which she relates to having COVID vaccine. She sleeps all the time. 3-4 naps per day. She can't take vitamins or iron she gets deathly sick. She won't take pain medication. She doesn't eat a whole lot. Everything available at home. BBQ ribs, potatoes, green beans, salad and cheesecake yesterday but 3 bites and full. Lima[employer] in morning gravy and biscuit 1/2. Has some vanilla wafers cookies and coffee. Daughter fixes food but she says it makes her sick. Little bowl of macaroni and cheese 4 bites. At night bowl of cereal in measuring 1/2 cup with milk. Buys her anything she wants.     The following portions of the patient's history were reviewed and   updated as appropriate: allergies, current medications, past family history, past medical history, past social history, past surgical history and problem list.    Compared to one year ago, the patient feels her physical   health is worse.    Compared to one year ago, the patient feels her mental    health is the same.    Recent Hospitalizations:  She was not admitted to the hospital during the last year.       Current Medical Providers:  Patient Care Team:  Symone Dinero MD as PCP - General (Family Medicine)    Outpatient Medications Prior to Visit   Medication Sig Dispense Refill   • acetaminophen (TYLENOL) 500 MG tablet Take 500 mg by mouth 2 (Two) Times a Day As Needed for Mild Pain .     • albuterol sulfate  (90 Base) MCG/ACT inhaler Inhale 2 puffs Every 4 (Four) Hours As Needed for Wheezing. 18 g 11   • famotidine (PEPCID) 40 MG tablet Take 1 tablet by mouth Every Morning. 90 tablet 3   • umeclidinium-vilanterol (Anoro Ellipta) 62.5-25 MCG/INH aerosol powder  inhaler Inhale 1 puff Daily. 60 each 11   • furosemide (LASIX) 20 MG tablet TAKE 1 TABLET BY MOUTH DAILY AS NEEDED FOR SWELLING 30 tablet 2   • metoprolol tartrate (LOPRESSOR) 25 MG tablet Take 0.5 tablets by mouth 2 (Two) Times a Day As Needed (BP >140/90). 90 tablet 3   • mirtazapine (REMERON) 15 MG tablet Take 1 tablet by mouth Every Night. 90 tablet 3     No facility-administered medications prior to visit.       No opioid medication identified on active medication list. I have reviewed chart for other potential  high risk medication/s and harmful drug interactions in the elderly.          Aspirin is not on active medication list.  Aspirin use is not indicated based on review of current medical condition/s. Risk of harm outweighs potential benefits.  .    Patient Active Problem List   Diagnosis   • Hearing loss   • GERD (gastroesophageal reflux disease)   • Emphysema lung (HCC)   • Bronchiectasis (HCC)   • Abnormal CT of the chest   • Loss of appetite   • CKD (chronic kidney disease) stage 2, GFR 60-89 ml/min   • Immunization refused   • Low body weight due to inadequate caloric intake   • Anorexia   • Peripheral edema   • Diastolic heart failure (HCC)   • Abnormal echocardiogram   • Essential hypertension     Advance Care  "Planning  Advance Directive is on file.  ACP discussion was held with the patient during this visit. Patient has an advance directive in EMR which is still valid.           Objective    Vitals:    10/23/21 1147   BP: 118/68   Pulse: 88   SpO2: 90%   Weight: 37.9 kg (83 lb 8 oz)   Height: 160 cm (62.99\")   PainSc:   8   PainLoc: Back     BMI Readings from Last 1 Encounters:   10/23/21 14.80 kg/m²   BMI is below normal parameters. Recommendations include: medication for appetite  Body mass index is 14.8 kg/m².  BMI has not been calculated during today's encounter.     Does the patient have evidence of cognitive impairment? Yes    Physical Exam  Vitals reviewed.   Constitutional:       General: She is not in acute distress.     Appearance: She is cachectic. She is not ill-appearing.   HENT:      Ears:      Comments: Profound hearing loss  Eyes:      General:         Right eye: No discharge.         Left eye: No discharge.      Conjunctiva/sclera: Conjunctivae normal.   Cardiovascular:      Rate and Rhythm: Normal rate and regular rhythm.   Pulmonary:      Effort: Pulmonary effort is normal.      Breath sounds: Normal breath sounds.   Abdominal:      General: There is no distension.      Palpations: Abdomen is soft.      Tenderness: There is no abdominal tenderness.   Musculoskeletal:      Right lower leg: No edema.      Left lower leg: No edema.   Neurological:      Mental Status: She is alert.   Psychiatric:         Mood and Affect: Mood normal.         Behavior: Behavior normal.         Thought Content: Thought content normal.         Judgment: Judgment normal.                 HEALTH RISK ASSESSMENT    Smoking Status:  Social History     Tobacco Use   Smoking Status Current Every Day Smoker   • Packs/day: 1.00   • Years: 50.00   • Pack years: 50.00   Smokeless Tobacco Never Used   Tobacco Comment    down to 0.5ppd     Alcohol Consumption:  Social History     Substance and Sexual Activity   Alcohol Use No     Fall " Risk Screen:    STEADI Fall Risk Assessment was completed, and patient is at MODERATE risk for falls. Assessment completed on:10/23/2021   STELAISHA Fall Risk Clinician Key Questions   Have you fallen in the past year?: Yes    Stay Idependant Patient Questions   Patient Fall Risk Assessment Score : 0        Depression Screening:  PHQ-2/PHQ-9 Depression Screening 10/23/2021   Little interest or pleasure in doing things 0   Feeling down, depressed, or hopeless 0   Total Score 0       Health Habits and Functional and Cognitive Screening:  Functional & Cognitive Status 10/23/2021   Do you have difficulty preparing food and eating? Yes   Do you have difficulty bathing yourself, getting dressed or grooming yourself? Yes   Do you have difficulty using the toilet? Yes   Do you have difficulty moving around from place to place? Yes   Do you have trouble with steps or getting out of a bed or a chair? Yes   Current Diet Other   Dental Exam Up to date   Eye Exam Up to date   Exercise (times per week) 0 times per week   Current Exercises Include No Regular Exercise   Current Exercise Activities Include -   Do you need help using the phone?  Yes   Are you deaf or do you have serious difficulty hearing?  Yes   Do you need help with transportation? Yes   Do you need help shopping? Yes   Do you need help preparing meals?  Yes   Do you need help with housework?  Yes   Do you need help with laundry? Yes   Do you need help taking your medications? Yes   Do you need help managing money? Yes   Do you ever drive or ride in a car without wearing a seat belt? No   Have you felt unusual stress, anger or loneliness in the last month? Yes   Who do you live with? Child   If you need help, do you have trouble finding someone available to you? No   Have you been bothered in the last four weeks by sexual problems? No   Do you have difficulty concentrating, remembering or making decisions? No       Age-appropriate Screening Schedule:  Refer to the  list below for future screening recommendations based on patient's age, sex and/or medical conditions. Orders for these recommended tests are listed in the plan section. The patient has been provided with a written plan.    Health Maintenance   Topic Date Due   • DXA SCAN  Never done   • TDAP/TD VACCINES (1 - Tdap) Never done   • ZOSTER VACCINE (1 of 2) Never done   • INFLUENZA VACCINE  Never done              Immunization History   Administered Date(s) Administered   • COVID-19 (PFIZER) 08/13/2021, 09/11/2021       Assessment/Plan   CMS Preventative Services Quick Reference  Risk Factors Identified During Encounter  Chronic Pain   Immunizations Discussed/Encouraged (specific Immunizations; Influenza and Pneumococcal 23  The above risks/problems have been discussed with the patient.  Follow up actions/plans if indicated are seen below in the Assessment/Plan Section.  Pertinent information has been shared with the patient in the After Visit Summary.    Diagnoses and all orders for this visit:    1. Medicare annual wellness visit, subsequent (Primary)  Return when fasting to check labs.  2. Pulmonary emphysema, unspecified emphysema type (HCC)  -     umeclidinium-vilanterol (Anoro Ellipta) 62.5-25 MCG/INH aerosol powder  inhaler; Inhale 1 puff Daily.  Dispense: 180 each; Refill: 3  Continue Anoro.  Discussed walking pulse oximetry to check need for home oxygen but family reports she will not wear oxygen because she does not want to stop smoking.  Discussed could increase inhaler to Trelegy in the future if needed.  3. Bronchiectasis without complication (HCC)  -     umeclidinium-vilanterol (Anoro Ellipta) 62.5-25 MCG/INH aerosol powder  inhaler; Inhale 1 puff Daily.  Dispense: 180 each; Refill: 3  Continue Anoro.  Discussed walking pulse oximetry to check need for home oxygen but family reports she will not wear oxygen because she does not want to stop smoking.  Discussed could increase inhaler to Trelegy in the  future if needed.  4. Gastroesophageal reflux disease, unspecified whether esophagitis present  -     famotidine (PEPCID) 40 MG tablet; Take 1 tablet by mouth Every Morning.  Dispense: 90 tablet; Refill: 3  Continue Pepcid.  5. Chronic diastolic heart failure (HCC)  She has not been compliant with using diuretics but is euvolemic on exam.  6. Essential hypertension  -     Comprehensive Metabolic Panel; Future  -     TSH Rfx On Abnormal To Free T4; Future  Blood pressure has resolved and not needing as needed medications at all.  7. Screening for deficiency anemia  -     CBC (No Diff); Future    8. Anorexia  -     CBC (No Diff); Future  -     Comprehensive Metabolic Panel; Future  Continue to offer regular food as tolerated.  9. BMI less than 19,adult  -     TSH Rfx On Abnormal To Free T4; Future  Patient's Body mass index is 14.8 kg/m². indicating that she is underweight (BMI < 18.5). Recommendations include: treating the underlying disease process. Counseled with diet.     10. Immunization refused  Declines flu and pneumonia vaccines.  11. Protein-calorie malnutrition, unspecified severity (HCC)  Continue to offer regular food as tolerated.      Follow Up:   Return in about 1 year (around 10/23/2022) for AWV.     An After Visit Summary and PPPS were made available to the patient.               Electronically signed by Symone Cuellar MD, 10/25/21, 7:45 AM EDT.

## 2021-10-23 NOTE — PATIENT INSTRUCTIONS
Medicare Wellness  Personal Prevention Plan of Service     Date of Office Visit:    Encounter Provider:  Symone Cuellar MD  Place of Service:  [facility]  Patient Name: Elvin Saucedo  :  1940    As part of the Medicare Wellness portion of your visit today, we are providing you with this personalized preventive plan of services (PPPS). This plan is based upon recommendations of the [place] Preventive Services Task Force (USPSTF) and the Advisory Committee on Immunization Practices (ACIP).    This lists the preventive care services that should be considered, and provides dates of when you are due. Items listed as completed are up-to-date and do not require any further intervention.    Health Maintenance   Topic Date Due   • DXA SCAN  Never done   • Pneumococcal Vaccine 65+ (1 of 2 - PPSV23) Never done   • TDAP/TD VACCINES (1 - Tdap) Never done   • ZOSTER VACCINE (1 of 2) Never done   • INFLUENZA VACCINE  Never done   • COVID-19 Vaccine (3 - Pfizer booster) 2022   • ANNUAL WELLNESS VISIT  10/23/2022       Orders Placed This Encounter   Procedures   • CBC (No Diff)     Standing Status:   Future     Standing Expiration Date:   10/23/2022     Order Specific Question:   Release to patient     Answer:   Immediate   • Comprehensive Metabolic Panel     Standing Status:   Future     Standing Expiration Date:   10/23/2022     Order Specific Question:   Release to patient     Answer:   Immediate   • TSH Rfx On Abnormal To Free T4     Standing Status:   Future     Standing Expiration Date:   10/23/2022     Order Specific Question:   Release to patient     Answer:   Immediate       Return in about 1 year (around 10/23/2022) for AWV.          Fall Prevention in the Home, Adult  Falls can cause injuries and can affect people from all age groups. There are many simple things that you can do to make your home safe and to help prevent falls. Ask for help when  making these changes, if needed.  What actions can I take to prevent falls?  General instructions  · Use good lighting in all rooms. Replace any light bulbs that burn out.  · Turn on lights if it is dark. Use night-lights.  · Place frequently used items in easy-to-reach places. Lower the shelves around your home if necessary.  · Set up furniture so that there are clear paths around it. Avoid moving your furniture around.  · Remove throw rugs and other tripping hazards from the floor.  · Avoid walking on wet floors.  · Fix any uneven floor surfaces.  · Add color or contrast paint or tape to grab bars and handrails in your home. Place contrasting color strips on the first and last steps of stairways.  · When you use a stepladder, make sure that it is completely opened and that the sides are firmly locked. Have someone hold the ladder while you are using it. Do not climb a closed stepladder.  · Be aware of any and all pets.  What can I do in the bathroom?         · Keep the floor dry. Immediately clean up any water that spills onto the floor.  · Remove soap buildup in the tub or shower on a regular basis.  · Use non-skid mats or decals on the floor of the tub or shower.  · Attach bath mats securely with double-sided, non-slip rug tape.  · If you need to sit down while you are in the shower, use a plastic, non-slip stool.  · Install grab bars by the toilet and in the tub and shower. Do not use towel bars as grab bars.  What can I do in the bedroom?  · Make sure that a bedside light is easy to reach.  · Do not use oversized bedding that drapes onto the floor.  · Have a firm chair that has side arms to use for getting dressed.  What can I do in the kitchen?  · Clean up any spills right away.  · If you need to reach for something above you, use a sturdy step stool that has a grab bar.  · Keep electrical cables out of the way.  · Do not use floor polish or wax that makes floors slippery. If you must use wax, make sure that  it is non-skid floor wax.  What can I do in the stairways?  · Do not leave any items on the stairs.  · Make sure that you have a light switch at the top of the stairs and the bottom of the stairs. Have them installed if you do not have them.  · Make sure that there are handrails on both sides of the stairs. Fix handrails that are broken or loose. Make sure that handrails are as long as the stairways.  · Install non-slip stair treads on all stairs in your home.  · Avoid having throw rugs at the top or bottom of stairways, or secure the rugs with carpet tape to prevent them from moving.  · Choose a carpet design that does not hide the edge of steps on the stairway.  · Check any carpeting to make sure that it is firmly attached to the stairs. Fix any carpet that is loose or worn.  What can I do on the outside of my home?  · Use bright outdoor lighting.  · Regularly repair the edges of walkways and driveways and fix any cracks.  · Remove high doorway thresholds.  · Trim any shrubbery on the main path into your home.  · Regularly check that handrails are securely fastened and in good repair. Both sides of any steps should have handrails.  · Install guardrails along the edges of any raised decks or porches.  · Clear walkways of debris and clutter, including tools and rocks.  · Have leaves, snow, and ice cleared regularly.  · Use sand or salt on walkways during winter months.  · In the garage, clean up any spills right away, including grease or oil spills.  What other actions can I take?  · Wear closed-toe shoes that fit well and support your feet. Wear shoes that have rubber soles or low heels.  · Use mobility aids as needed, such as canes, walkers, scooters, and crutches.  · Review your medicines with your health care provider. Some medicines can cause dizziness or changes in blood pressure, which increase your risk of falling.  Talk with your health care provider about other ways that you can decrease your risk of  falls. This may include working with a physical therapist or  to improve your strength, balance, and endurance.  Where to find more information  · Centers for Disease Control and Prevention, STEADI: https://www.cdc.gov  · National [place] on Aging: https://bw7qpba.mary lou.nih.gov  Contact a health care provider if:  · You are afraid of falling at home.  · You feel weak, drowsy, or dizzy at home.  · You fall at home.  Summary  · There are many simple things that you can do to make your home safe and to help prevent falls.  · Ways to make your home safe include removing tripping hazards and installing grab bars in the bathroom.  · Ask for help when making these changes in your home.  This information is not intended to replace advice given to you by your health care provider. Make sure you discuss any questions you have with your health care provider.  Document Revised: 11/30/2018 Document Reviewed: 08/02/2018  Elsevier Patient Education © 2021 [employer]

## 2021-11-08 NOTE — PROGRESS NOTES
"Subjective   Taliania Jaja Saucedo is a 81 y.o. female.   Chief Complaint   Patient presents with   • Back Pain     no really urinating, back pain going on for 1 week   • Anorexia     no appetite, hasn't really ate or drinking in a week. when she does eat she gets sick or has diarhea        History of Present Illness   Patient is brought in by her daughter Laura, patient very [place], continues to smoke, her PCP Dr. Kalen Cuellar has recommended oxygen, daughter states patient refuses at this time; O2 88 % with ambulation, up to 91% at rest. , no appetite, right low back pain; states her back pain started a week ago, got really bad yesterday. Drinks just sips of water, has had diarrhea the past few days. States her right hip is painful to touch. She did take OTC AZO, urine is orange.  Daughter states patient did not fall, but did lose her balance a couple of weeks ago and \"slid down\" into the floor.  The following portions of the patient's history were reviewed and updated as appropriate: allergies, current medications, past family history, past medical history, past social history, past surgical history and problem list.    Review of Systems   Constitutional: Positive for fatigue.   HENT: Positive for hearing loss.    Respiratory: Positive for cough and shortness of breath.    Gastrointestinal: Positive for diarrhea. Negative for abdominal pain.   Genitourinary: Positive for difficulty urinating and flank pain. Negative for dysuria and frequency.       Objective   Physical Exam  Vitals reviewed.   Constitutional:       General: She is not in acute distress.     Appearance: She is cachectic. She is not ill-appearing, toxic-appearing or diaphoretic.   HENT:      Head: Normocephalic and atraumatic.   Cardiovascular:      Rate and Rhythm: Normal rate.   Pulmonary:      Comments: Decreased throughout  Abdominal:      General: Abdomen is flat.      Palpations: Abdomen is soft.      Tenderness: There is no abdominal " "tenderness. There is right CVA tenderness. There is no left CVA tenderness.   Skin:     General: Skin is warm and dry.   Neurological:      Mental Status: She is alert.   Psychiatric:         Mood and Affect: Mood normal.       /62   Pulse 63   Ht 160 cm (62.99\")   Wt 37.6 kg (83 lb)   SpO2 91%   BMI 14.71 kg/m²     Assessment/Plan   Diagnoses and all orders for this visit:    1. Urinary disorder (Primary)  -     POCT urinalysis dipstick, automated  -     Urine Culture - Urine, Urine, Random Void; Future    2. Unspecified abnormal findings in urine   -     Urine Culture - Urine, Urine, Random Void; Future  -     cephalexin (Keflex) 500 MG capsule; Take 1 capsule by mouth 2 (Two) Times a Day for 7 days.  Dispense: 14 capsule; Refill: 0    3. Acute right hip pain  -     XR Hip With or Without Pelvis 2 - 3 View Right; Future    4. Acute right flank pain  -     XR Spine Lumbar 2 or 3 View; Future    5. Diarrhea, unspecified type          Results for orders placed or performed in visit on 11/08/21   POCT urinalysis dipstick, automated    Specimen: Urine   Result Value Ref Range    Color Ketty Yellow, Straw, Dark Yellow, Ketty    Clarity, UA Clear Clear    Specific Gravity  1.025 1.005 - 1.030    pH, Urine 6.0 5.0 - 8.0    Leukocytes Large (3+) (A) Negative    Nitrite, UA Positive (A) Negative    Protein, POC 1+ (A) Negative mg/dL    Glucose, UA 1+ (A) Negative, 1000 mg/dL (3+) mg/dL    Ketones, UA Trace (A) Negative    Urobilinogen, UA 8 E.U./dL  (A) Normal    Bilirubin Large (3+) (A) Negative    Blood, UA Negative Negative    Lot Number [id number]     Expiration Date 03/18/23      UA results may not be accurate due to orange AZO; send for culture, keflex prescribed. Increase water, gatorade zero, [place] diet for diarrhea  xrays ordered to check for hip or lumbar fracture  Patient was encouraged to keep me informed of any acute changes, lack of improvement, or any new concerning symptoms.  Discussed need " for oxygen, recommend follow up with PCP

## 2021-11-08 NOTE — PATIENT INSTRUCTIONS
Flank Pain, Adult  Flank pain is pain in your side. The flank is the area of your side between your upper belly (abdomen) and your back. The pain may occur over a short time (acute), or it may be long-term or come back often (chronic). It may be mild or very bad. Pain in this area can be caused by many different things.  Follow these instructions at home:    · Drink enough fluid to keep your pee (urine) clear or pale yellow.  · Rest as told by your doctor.  · Take over-the-counter and prescription medicines only as told by your doctor.  · Keep a journal to keep track of:  ? What has caused your flank pain.  ? What has made it feel better.  · Keep all follow-up visits as told by your doctor. This is important.  Contact a doctor if:  · Medicine does not help your pain.  · You have new symptoms.  · Your pain gets worse.  · You have a fever.  · Your symptoms last longer than 2-3 days.  · You have trouble peeing.  · You are peeing more often than normal.  Get help right away if:  · You have trouble breathing.  · You are short of breath.  · Your belly hurts, or it is swollen or red.  · You feel sick to your stomach (nauseous).  · You throw up (vomit).  · You feel like you will pass out, or you do pass out (faint).  · You have blood in your pee.  Summary  · Flank pain is pain in your side. The flank is the area of your side between your upper belly (abdomen) and your back.  · Flank pain may occur over a short time (acute), or it may be long-term or come back often (chronic). It may be mild or very bad.  · Pain in this area can be caused by many different things.  · Contact your doctor if your symptoms get worse or they last longer than 2-3 days.  This information is not intended to replace advice given to you by your health care provider. Make sure you discuss any questions you have with your health care provider.  Document Revised: 11/30/2018 Document Reviewed: 04/09/2018  [employer] Patient Education © 2021 [employer]    Urinary Tract Infection, Adult  A urinary tract infection (UTI) is an infection of any part of the urinary tract. The urinary tract includes:  · The kidneys.  · The ureters.  · The bladder.  · The urethra.  These organs make, store, and get rid of pee (urine) in the body.  What are the causes?  This is caused by germs (bacteria) in your genital area. These germs grow and cause swelling (inflammation) of your urinary tract.  What increases the risk?  You are more likely to develop this condition if:  · You have a small, thin tube (catheter) to drain pee.  · You cannot control when you pee or poop (incontinence).  · You are female, and:  ? You use these methods to prevent pregnancy:  § A medicine that kills sperm (spermicide).  § A device that blocks sperm (diaphragm).  ? You have low levels of a female hormone (estrogen).  ? You are pregnant.  · You have genes that add to your risk.  · You are sexually active.  · You take antibiotic medicines.  · You have trouble peeing because of:  ? A prostate that is bigger than normal, if you are male.  ? A blockage in the part of your body that drains pee from the bladder (urethra).  ? A kidney stone.  ? A nerve condition that affects your bladder (neurogenic bladder).  ? Not getting enough to drink.  ? Not peeing often enough.  · You have other conditions, such as:  ? Diabetes.  ? A weak disease-fighting system (immune system).  ? Sickle cell disease.  ? Gout.  ? Injury of the spine.  What are the signs or symptoms?  Symptoms of this condition include:  · Needing to pee right away (urgently).  · Peeing often.  · Peeing small amounts often.  · Pain or burning when peeing.  · Blood in the pee.  · Pee that smells bad or not like normal.  · Trouble peeing.  · Pee that is cloudy.  · Fluid coming from the vagina, if you are female.  · Pain in the belly or lower back.  Other symptoms include:  · Throwing up (vomiting).  · No urge to eat.  · Feeling mixed up  (confused).  · Being tired and grouchy (irritable).  · A fever.  · Watery poop (diarrhea).  How is this treated?  This condition may be treated with:  · Antibiotic medicine.  · Other medicines.  · Drinking enough water.  Follow these instructions at home:    Medicines  · Take over-the-counter and prescription medicines only as told by your doctor.  · If you were prescribed an antibiotic medicine, take it as told by your doctor. Do not stop taking it even if you start to feel better.  General instructions  · Make sure you:  ? Pee until your bladder is empty.  ? Do not hold pee for a long time.  ? Empty your bladder after sex.  ? Wipe from front to back after pooping if you are a female. Use each tissue one time when you wipe.  · Drink enough fluid to keep your pee pale yellow.  · Keep all follow-up visits as told by your doctor. This is important.  Contact a doctor if:  · You do not get better after 1-2 days.  · Your symptoms go away and then come back.  Get help right away if:  · You have very bad back pain.  · You have very bad pain in your lower belly.  · You have a fever.  · You are sick to your stomach (nauseous).  · You are throwing up.  Summary  · A urinary tract infection (UTI) is an infection of any part of the urinary tract.  · This condition is caused by germs in your genital area.  · There are many risk factors for a UTI. These include having a small, thin tube to drain pee and not being able to control when you pee or poop.  · Treatment includes antibiotic medicines for germs.  · Drink enough fluid to keep your pee pale yellow.  This information is not intended to replace advice given to you by your health care provider. Make sure you discuss any questions you have with your health care provider.  Document Revised: 12/05/2019 Document Reviewed: 06/27/2019  [employer] Patient Education © 2021 [employer]    Food Choices to Help Relieve Diarrhea, Adult  Diarrhea can make you feel weak and cause you to  become dehydrated. It is important to choose the right foods and drinks to:  · Relieve diarrhea.  · Replace lost fluids and nutrients.  · Prevent dehydration.  What are tips for following this plan?  Relieving diarrhea  · Avoid foods that make your diarrhea worse. These may include:  ? Foods and beverages sweetened with high-fructose corn syrup, honey, or sweeteners such as xylitol, sorbitol, and mannitol.  ? Fried, greasy, or spicy foods.  ? Raw fruits and vegetables.  · Eat foods that are rich in probiotics. These include foods such as yogurt and fermented milk products. Probiotics can help increase healthy bacteria in your stomach and intestines (gastrointestinal tract or GI tract). This may help digestion and stop diarrhea.  · If you have lactose intolerance, avoid dairy products. These may make your diarrhea worse.  · Take medicine to help stop diarrhea only as told by your health care provider.  Replacing nutrients    · Eat bland, easy-to-digest foods in small amounts as you are able, until your diarrhea starts to get better. These foods include bananas, applesauce, rice, toast, and crackers.  · Gradually reintroduce nutrient-rich foods as tolerated or as told by your health care provider. This includes:  ? Well-cooked protein foods, such as eggs, lean meats like fish or chicken without skin, and tofu.  ? Peeled, seeded, and soft-cooked fruits and vegetables.  ? Low-fat dairy products.  ? Whole grains.  · Take vitamin and mineral supplements as told by your health care provider.    Preventing dehydration    · Start by sipping water or a solution to prevent dehydration (oral rehydration solution, ORS). This is a drink that helps replace fluids and minerals your body has lost. You can buy an ORS at pharmacies and retail stores.  · Try to drink at least 8-10 cups (2,000-2,500 mL) of fluid each day to help replace lost fluids. If you have urine that is pale yellow, you are getting enough fluids.  · You may drink  other liquids in addition to water, such as fruit juice that you have added water to (diluted fruit juice) or low-calorie sports drinks, as tolerated or as told by your health care provider.  · Avoid drinks with caffeine, such as coffee, tea, or soft drinks.  · Avoid alcohol.    Summary  · When you have diarrhea, it is important to choose the right foods and drinks to relieve diarrhea, to replace lost fluids and nutrients, and to prevent dehydration.  · Make sure you drink enough fluid to keep your urine pale yellow.  · You may benefit from eating bland foods at first. Gradually reintroduce healthy, nutrient-rich foods as tolerated or as told by your health care provider.  · Avoid foods that make your diarrhea worse, such as fried, greasy, or spicy foods.  This information is not intended to replace advice given to you by your health care provider. Make sure you discuss any questions you have with your health care provider.  Document Revised: 02/02/2021 Document Reviewed: 02/02/2021  [employer] Patient Education © 2021 [employer]    [place] Diet  A bland diet consists of foods that are often soft and do not have a lot of fat, fiber, or extra seasonings. Foods without fat, fiber, or seasoning are easier for the body to digest. They are also less likely to irritate your mouth, throat, stomach, and other parts of your digestive system. A bland diet is sometimes called a BRAT diet.  What is my plan?  Your health care provider or food and nutrition specialist (dietitian) may recommend specific changes to your diet to prevent symptoms or to treat your symptoms. These changes may include:  · Eating small meals often.  · Cooking food until it is soft enough to chew easily.  · Chewing your food well.  · Drinking fluids slowly.  · Not eating foods that are very spicy, sour, or fatty.  · Not eating citrus fruits, such as oranges and grapefruit.  What do I need to know about this diet?  · Eat a variety of foods from the bland  diet food list.  · Do not follow a bland diet longer than needed.  · Ask your health care provider whether you should take vitamins or supplements.  What foods can I eat?  Grains    Hot cereals, such as cream of wheat. Rice. Bread, crackers, or tortillas made from refined white flour.  Vegetables  Canned or cooked vegetables. Mashed or boiled potatoes.  Fruits    Bananas. Applesauce. Other types of cooked or canned fruit with the skin and seeds removed, such as canned peaches or pears.  Meats and other proteins    Scrambled eggs. Creamy peanut butter or other nut butters. Lean, well-cooked meats, such as chicken or fish. Tofu. Soups or broths.  Dairy  Low-fat dairy products, such as milk, cottage cheese, or yogurt.  Beverages    Water. Herbal tea. Apple juice.  Fats and oils  Mild salad dressings. [place]ola or olive oil.  Sweets and desserts  Pudding. Custard. Fruit gelatin. Ice cream.  The items listed above may not be a complete list of recommended foods and beverages. Contact a dietitian for more options.  What foods are not recommended?  Grains  Whole grain breads and cereals.  Vegetables  Raw vegetables.  Fruits  Raw fruits, especially citrus, berries, or dried fruits.  Dairy  Whole fat dairy foods.  Beverages  Caffeinated drinks. Alcohol.  Seasonings and condiments  Strongly flavored seasonings or condiments. Hot sauce. Salsa.  Other foods  Spicy foods. Fried foods. Sour foods, such as pickled or fermented foods. Foods with high sugar content. Foods high in fiber.  The items listed above may not be a complete list of foods and beverages to avoid. Contact a dietitian for more information.  Summary  · A bland diet consists of foods that are often soft and do not have a lot of fat, fiber, or extra seasonings.  · Foods without fat, fiber, or seasoning are easier for the body to digest.  · Check with your health care provider to see how long you should follow this diet plan. It is not meant to be followed for long  periods.  This information is not intended to replace advice given to you by your health care provider. Make sure you discuss any questions you have with your health care provider.  Document Revised: 01/16/2019 Document Reviewed: 01/16/2019  [employer] Patient Education © 2021 [employer]